# Patient Record
Sex: MALE | Race: BLACK OR AFRICAN AMERICAN
[De-identification: names, ages, dates, MRNs, and addresses within clinical notes are randomized per-mention and may not be internally consistent; named-entity substitution may affect disease eponyms.]

---

## 2017-02-16 ENCOUNTER — HOSPITAL ENCOUNTER (OUTPATIENT)
Dept: HOSPITAL 17 - HSDC | Age: 77
Discharge: HOME | End: 2017-02-16
Attending: SURGERY
Payer: MEDICARE

## 2017-02-16 VITALS — BODY MASS INDEX: 19.38 KG/M2 | HEIGHT: 70 IN | WEIGHT: 135.36 LBS

## 2017-02-16 VITALS
RESPIRATION RATE: 16 BRPM | DIASTOLIC BLOOD PRESSURE: 75 MMHG | HEART RATE: 81 BPM | TEMPERATURE: 97.4 F | OXYGEN SATURATION: 100 % | SYSTOLIC BLOOD PRESSURE: 110 MMHG

## 2017-02-16 DIAGNOSIS — N18.6: Primary | ICD-10-CM

## 2017-02-16 DIAGNOSIS — T82.898A: ICD-10-CM

## 2017-02-16 DIAGNOSIS — I12.0: ICD-10-CM

## 2017-02-16 DIAGNOSIS — E78.00: ICD-10-CM

## 2017-02-16 LAB
ANION GAP SERPL CALC-SCNC: 5 MEQ/L (ref 5–15)
BASOPHILS # BLD AUTO: 0 TH/MM3 (ref 0–0.2)
BASOPHILS NFR BLD: 0.5 % (ref 0–2)
BUN SERPL-MCNC: 23 MG/DL (ref 7–18)
CHLORIDE SERPL-SCNC: 107 MEQ/L (ref 98–107)
EOSINOPHIL # BLD: 0 TH/MM3 (ref 0–0.4)
EOSINOPHIL NFR BLD: 0.8 % (ref 0–4)
ERYTHROCYTE [DISTWIDTH] IN BLOOD BY AUTOMATED COUNT: 17.1 % (ref 11.6–17.2)
GFR SERPLBLD BASED ON 1.73 SQ M-ARVRAT: 18 ML/MIN (ref 89–?)
HCO3 BLD-SCNC: 28.6 MEQ/L (ref 21–32)
HCT VFR BLD CALC: 29.8 % (ref 39–51)
HEMO FLAGS: (no result)
LYMPHOCYTES # BLD AUTO: 0.8 TH/MM3 (ref 1–4.8)
LYMPHOCYTES NFR BLD AUTO: 20.7 % (ref 9–44)
MCH RBC QN AUTO: 36.8 PG (ref 27–34)
MCHC RBC AUTO-ENTMCNC: 33.7 % (ref 32–36)
MCV RBC AUTO: 109.2 FL (ref 80–100)
MONOCYTES NFR BLD: 13.9 % (ref 0–8)
NEUTROPHILS # BLD AUTO: 2.6 TH/MM3 (ref 1.8–7.7)
NEUTROPHILS NFR BLD AUTO: 64.1 % (ref 16–70)
PLATELET # BLD: 131 TH/MM3 (ref 150–450)
POTASSIUM SERPL-SCNC: 4.7 MEQ/L (ref 3.5–5.1)
RBC # BLD AUTO: 2.73 MIL/MM3 (ref 4.5–5.9)
SODIUM SERPL-SCNC: 141 MEQ/L (ref 136–145)
WBC # BLD AUTO: 4 TH/MM3 (ref 4–11)

## 2017-02-16 PROCEDURE — 49422 REMOVE TUNNELED IP CATH: CPT

## 2017-02-16 PROCEDURE — 85025 COMPLETE CBC W/AUTO DIFF WBC: CPT

## 2017-02-16 PROCEDURE — 36818 AV FUSE UPPR ARM CEPHALIC: CPT

## 2017-02-16 PROCEDURE — 80048 BASIC METABOLIC PNL TOTAL CA: CPT

## 2017-02-16 PROCEDURE — 01844 ANES VASC SHUNT/SHUNT REVJ: CPT

## 2017-02-17 NOTE — MP
cc:

BRANDON REYES M.D.

****

 

Corrected Copy:  2/22/17

 

DATE OF SURGERY: 02/16/2017.

 

PREOPERATIVE DIAGNOSIS:

Chronic kidney disease, non-utilized peritoneal dialysis catheter. Needs

permanent hemodialysis access.

 

POSTOPERATIVE DIAGNOSIS:

Chronic kidney disease, non-utilized peritoneal dialysis catheter. Needs

permanent hemodialysis access.

 

OPERATIVE PROCEDURE PERFORMED:

1. Right radiocephalic arteriovenous fistula creation.

2. Removal of non-used peritoneal dialysis catheter.

 

SURGEON:

Brandon Reyes M.D.

 

FIRST ASSISTANT:

TRUNG Green.

 

ANESTHESIA:

Local MAC.

 

DESCRIPTION OF THE PROCEDURE IN DETAIL:

With the patient in the supine position and under IV sedation, the right arm

and abdomen were prepped with Betadine and draped in a sterile fashion.  One

gram of Ancef was administered intravenously, and following a protocol

time-out, the skin and subcutaneous tissue along the distal lateral volar

forearm was infiltrated with 0.5% Marcaine with epinephrine.

 

A vertical 3 cm incision was performed through which the radial artery and

cephalic vein were circumferentially mobilized. The vein was ligated distally

with 4-0 silk, transected proximal to the ligature, spatulated on-end, gently

flushed and gently hydrostatically dilated with heparinized saline and occluded

with a Yasargil clip.  The radial artery was occluded proximally and distally

with Yasargil clips. A vertical approximately 4 mm arteriotomy was performed

along the anterolateral surface. The arterial lumen was flushed proximally and

distally with heparinized saline.

 

The vein was anastomosed to the arteriotomy with continuous 7-0 Prolene.  The

occluding Yasargil clips were then removed establishing pulsatile flow within

the fistula as well as into the distal radial artery.  Strict hemostasis was

assured.  The incision was closed with interrupted subcutaneous 5-0 Monocryl,

continuous subcuticular 5-0 Monocryl, reinforced with Steri-Strips and covered

with sterile gauze.

 

Attention was then directed to removal of the non-used peritoneal dialysis

catheter.  The skin and subcutaneous tissue surrounding the catheter placement

scar was infiltrated with 0.5% Marcaine with epinephrine.  The old scar was

re-incised and dissection continued sharply through the underlying subcutaneous

tissue.  The internal catheter cuff embedded within the rectus musculature was

completely mobilized free and the intraperitoneal portion of the catheter

removed. The subcutaneous catheter cuff was mobilized and subcutaneous portion

of the catheter removed entirely as well.  The circular-shaped the rectus

fascial defect was secured with interrupted 0-PDS and the skin incision secured

with continuous subcuticular 5-0 Monocryl, reinforced with Steri-Strips and

covered with sterile gauze.  Instrument, needle, sponge counts were correct x2.

No operative complications.  The patient returned to the recovery room in

stable condition having tolerated the procedure well.

 

NOTE:

The right radial artery and cephalic vein are both of minimally adequate

diameter for AV fistula creation attempt.  Because of the small vascular

diameters, maturation may be delayed or  could prove inadequate.

 

 

                              _________________________________

                              MD JUAN Abdi/JCROBERT

D:  2/16/2017/9:14 PM

T:  2/22/2017/9:52 AM

Visit #:  [id number]

Job #:  [id number]

## 2017-03-17 ENCOUNTER — HOSPITAL ENCOUNTER (INPATIENT)
Dept: HOSPITAL 17 - NEPC | Age: 77
LOS: 2 days | Discharge: HOME | DRG: 69 | End: 2017-03-19
Attending: FAMILY MEDICINE | Admitting: FAMILY MEDICINE
Payer: MEDICARE

## 2017-03-17 VITALS
RESPIRATION RATE: 17 BRPM | SYSTOLIC BLOOD PRESSURE: 180 MMHG | OXYGEN SATURATION: 97 % | TEMPERATURE: 97.1 F | DIASTOLIC BLOOD PRESSURE: 102 MMHG | HEART RATE: 96 BPM

## 2017-03-17 VITALS — WEIGHT: 136.25 LBS | HEIGHT: 69 IN | BODY MASS INDEX: 20.18 KG/M2

## 2017-03-17 VITALS
RESPIRATION RATE: 16 BRPM | SYSTOLIC BLOOD PRESSURE: 183 MMHG | DIASTOLIC BLOOD PRESSURE: 99 MMHG | HEART RATE: 97 BPM | OXYGEN SATURATION: 95 %

## 2017-03-17 VITALS — HEART RATE: 90 BPM

## 2017-03-17 VITALS
RESPIRATION RATE: 14 BRPM | OXYGEN SATURATION: 97 % | HEART RATE: 94 BPM | SYSTOLIC BLOOD PRESSURE: 184 MMHG | DIASTOLIC BLOOD PRESSURE: 98 MMHG

## 2017-03-17 VITALS
HEART RATE: 100 BPM | RESPIRATION RATE: 20 BRPM | OXYGEN SATURATION: 97 % | SYSTOLIC BLOOD PRESSURE: 187 MMHG | TEMPERATURE: 97.6 F | DIASTOLIC BLOOD PRESSURE: 101 MMHG

## 2017-03-17 VITALS
SYSTOLIC BLOOD PRESSURE: 168 MMHG | OXYGEN SATURATION: 97 % | TEMPERATURE: 97.1 F | DIASTOLIC BLOOD PRESSURE: 104 MMHG | HEART RATE: 88 BPM | RESPIRATION RATE: 18 BRPM

## 2017-03-17 VITALS — OXYGEN SATURATION: 96 %

## 2017-03-17 VITALS
RESPIRATION RATE: 18 BRPM | OXYGEN SATURATION: 96 % | SYSTOLIC BLOOD PRESSURE: 182 MMHG | DIASTOLIC BLOOD PRESSURE: 99 MMHG | HEART RATE: 92 BPM

## 2017-03-17 VITALS — OXYGEN SATURATION: 94 %

## 2017-03-17 VITALS — OXYGEN SATURATION: 97 %

## 2017-03-17 DIAGNOSIS — H53.8: ICD-10-CM

## 2017-03-17 DIAGNOSIS — I35.1: ICD-10-CM

## 2017-03-17 DIAGNOSIS — I45.81: ICD-10-CM

## 2017-03-17 DIAGNOSIS — Z66: ICD-10-CM

## 2017-03-17 DIAGNOSIS — Z99.2: ICD-10-CM

## 2017-03-17 DIAGNOSIS — N18.6: ICD-10-CM

## 2017-03-17 DIAGNOSIS — I12.0: ICD-10-CM

## 2017-03-17 DIAGNOSIS — Z79.82: ICD-10-CM

## 2017-03-17 DIAGNOSIS — E78.00: ICD-10-CM

## 2017-03-17 DIAGNOSIS — G45.9: Primary | ICD-10-CM

## 2017-03-17 DIAGNOSIS — R26.2: ICD-10-CM

## 2017-03-17 DIAGNOSIS — E07.9: ICD-10-CM

## 2017-03-17 DIAGNOSIS — R47.01: ICD-10-CM

## 2017-03-17 DIAGNOSIS — Z21: ICD-10-CM

## 2017-03-17 LAB
ALP SERPL-CCNC: 212 U/L (ref 45–117)
ALT SERPL-CCNC: 31 U/L (ref 12–78)
ANION GAP SERPL CALC-SCNC: 9 MEQ/L (ref 5–15)
APTT BLD: 24.4 SEC (ref 24.3–30.1)
AST SERPL-CCNC: 39 U/L (ref 15–37)
BASOPHILS # BLD AUTO: 0 TH/MM3 (ref 0–0.2)
BASOPHILS NFR BLD: 0.7 % (ref 0–2)
BILIRUB SERPL-MCNC: 0.4 MG/DL (ref 0.2–1)
BUN SERPL-MCNC: 36 MG/DL (ref 7–18)
CHLORIDE SERPL-SCNC: 102 MEQ/L (ref 98–107)
EOSINOPHIL # BLD: 0.1 TH/MM3 (ref 0–0.4)
EOSINOPHIL NFR BLD: 2.1 % (ref 0–4)
ERYTHROCYTE [DISTWIDTH] IN BLOOD BY AUTOMATED COUNT: 16.9 % (ref 11.6–17.2)
GFR SERPLBLD BASED ON 1.73 SQ M-ARVRAT: 10 ML/MIN (ref 89–?)
HCO3 BLD-SCNC: 29.8 MEQ/L (ref 21–32)
HCT VFR BLD CALC: 37 % (ref 39–51)
HEMO FLAGS: (no result)
INR PPP: 1 RATIO
LYMPHOCYTES # BLD AUTO: 0.9 TH/MM3 (ref 1–4.8)
LYMPHOCYTES NFR BLD AUTO: 21.1 % (ref 9–44)
MCH RBC QN AUTO: 34.7 PG (ref 27–34)
MCHC RBC AUTO-ENTMCNC: 31.8 % (ref 32–36)
MCV RBC AUTO: 109 FL (ref 80–100)
MONOCYTES NFR BLD: 10.9 % (ref 0–8)
NEUTROPHILS # BLD AUTO: 2.8 TH/MM3 (ref 1.8–7.7)
NEUTROPHILS NFR BLD AUTO: 65.2 % (ref 16–70)
PLATELET # BLD: 127 TH/MM3 (ref 150–450)
POTASSIUM SERPL-SCNC: 4.5 MEQ/L (ref 3.5–5.1)
PROTHROMBIN TIME: 11.4 SEC (ref 9.8–11.6)
RBC # BLD AUTO: 3.39 MIL/MM3 (ref 4.5–5.9)
SODIUM SERPL-SCNC: 141 MEQ/L (ref 136–145)
WBC # BLD AUTO: 4.3 TH/MM3 (ref 4–11)

## 2017-03-17 PROCEDURE — 80048 BASIC METABOLIC PNL TOTAL CA: CPT

## 2017-03-17 PROCEDURE — 80053 COMPREHEN METABOLIC PANEL: CPT

## 2017-03-17 PROCEDURE — 5A1D00Z: ICD-10-PCS | Performed by: INTERNAL MEDICINE

## 2017-03-17 PROCEDURE — 70551 MRI BRAIN STEM W/O DYE: CPT

## 2017-03-17 PROCEDURE — 93005 ELECTROCARDIOGRAM TRACING: CPT

## 2017-03-17 PROCEDURE — 80061 LIPID PANEL: CPT

## 2017-03-17 PROCEDURE — 90935 HEMODIALYSIS ONE EVALUATION: CPT

## 2017-03-17 PROCEDURE — 96374 THER/PROPH/DIAG INJ IV PUSH: CPT

## 2017-03-17 PROCEDURE — 70544 MR ANGIOGRAPHY HEAD W/O DYE: CPT

## 2017-03-17 PROCEDURE — 96375 TX/PRO/DX INJ NEW DRUG ADDON: CPT

## 2017-03-17 PROCEDURE — 82948 REAGENT STRIP/BLOOD GLUCOSE: CPT

## 2017-03-17 PROCEDURE — 85730 THROMBOPLASTIN TIME PARTIAL: CPT

## 2017-03-17 PROCEDURE — 84100 ASSAY OF PHOSPHORUS: CPT

## 2017-03-17 PROCEDURE — 93880 EXTRACRANIAL BILAT STUDY: CPT

## 2017-03-17 PROCEDURE — 84484 ASSAY OF TROPONIN QUANT: CPT

## 2017-03-17 PROCEDURE — 83036 HEMOGLOBIN GLYCOSYLATED A1C: CPT

## 2017-03-17 PROCEDURE — 83735 ASSAY OF MAGNESIUM: CPT

## 2017-03-17 PROCEDURE — 85025 COMPLETE CBC W/AUTO DIFF WBC: CPT

## 2017-03-17 PROCEDURE — 85610 PROTHROMBIN TIME: CPT

## 2017-03-17 PROCEDURE — 93306 TTE W/DOPPLER COMPLETE: CPT

## 2017-03-17 PROCEDURE — 70450 CT HEAD/BRAIN W/O DYE: CPT

## 2017-03-17 RX ADMIN — SODIUM CHLORIDE, PRESERVATIVE FREE SCH ML: 5 INJECTION INTRAVENOUS at 23:06

## 2017-03-17 RX ADMIN — INSULIN ASPART SCH: 100 INJECTION, SOLUTION INTRAVENOUS; SUBCUTANEOUS at 21:00

## 2017-03-17 RX ADMIN — CALCIUM CARBONATE-CHOLECALCIFEROL TAB 250 MG-125 UNIT SCH MG: 250-125 TAB at 23:06

## 2017-03-17 RX ADMIN — PRAVASTATIN SODIUM SCH MG: 40 TABLET ORAL at 23:06

## 2017-03-17 RX ADMIN — HEPARIN SODIUM SCH UNITS: 10000 INJECTION, SOLUTION INTRAVENOUS; SUBCUTANEOUS at 13:55

## 2017-03-17 RX ADMIN — INSULIN ASPART SCH: 100 INJECTION, SOLUTION INTRAVENOUS; SUBCUTANEOUS at 16:00

## 2017-03-17 NOTE — PD
HPI


Chief Complaint:  Dizziness


Time Seen by Provider:  10:17


Travel History


International Travel<30 days:  No


Contact w/Intl Traveler<30days:  No


Traveled to known affect area:  No





History of Present Illness


HPI


This is a 77-year-old male who has a history of end-stage renal disease 

presents to the emergency department having woken up with dizziness this 

morning.  He says he feels like he can't catch his balance and he feels weak.  

He was having difficulty writing and difficulty getting his words out.  His 

symptoms have been constant and severe throughout the morning and is having 

difficulty walking on his own.  He's never had symptoms like this before.  He 

was supposed to go to dialysis this morning but missed it because he came here.

  His doctor is Dr. Maldonado.





[facility]


Past Medical History


Hx Anticoagulant Therapy:  Yes (BABY ASA DAILY)


Anemia:  Yes


Autoimmune Disease:  Yes (HIV +)


Anxiety:  No


Depression:  No


Heart Rhythm Problems:  Yes (tachycardia)


Cancer:  No


Cardiovascular Problems:  Yes (HTN, CHOL)


High Cholesterol:  Yes


Congestive Heart Failure:  No


COPD:  No


Cerebrovascular Accident:  No


Diabetes:  No


Dialysis:  Yes (was on peritoreal, started hemodialysis 2 months ago)


Diminished Hearing:  No


Deep Vein Thrombosis:  Yes


Endocrine:  Yes


Gastrointestinal Disorders:  Yes (NAUSEA/VOMITING)


Genitourinary:  Yes (renal failure on dialysis )


Hepatitis:  No


Hiatal Hernia:  No


Hypertension:  Yes (NO LONGER, CURRENTLY HYPOTENSION)


Immune Disorder:  Yes (HIV)


Inguinal Hernia:  Yes


Implanted Vascular Access Dvce:  Yes


Musculoskeletal:  No


Neurologic:  Yes (HX OF VERTIGO, NEUROPATHY KAY FEET)


Psychiatric:  No


Reproductive:  No


Respiratory:  No


Immunizations Current:  No


Renal Failure:  Yes


Thyroid Disease:  Yes


Tetanus Vaccination:  < 5 Years


Influenza Vaccination:  Yes


Pregnant?:  Not Pregnant





Past Surgical History


Abdominal Surgery:  Yes (hernia repair x2 groin)


AICD:  No


Body Medical Devices:  TENKHOFF PERITONEAL DIALYSIS CATHETER removed


Joint Replacement:  No


Neurologic Surgery:  Yes (CYST REMOVED FROM SPINAL CORD )


Pacemaker:  No


Other Surgery:  Yes (vascath r chest)





Social History


Alcohol Use:  No


Tobacco Use:  No


Substance Use:  No





Allergies-Medications


(Allergen,Severity, Reaction):  


Coded Allergies:  


     No Known Allergies (Verified , 2/16/17)


Reported Meds & Prescriptions





Reported Meds & Active Scripts


Active


Reported


Cardura (Doxazosin Mesylate) 2 Mg Tab 2 Mg PO HS


Zenpep (Pancrelipase) 25,000-85,000-136,000 Units Cap 2 Cap PO BID


Renvela (Sevelamer Carbonate) 800 Mg Tab 800 Mg PO BID


Megestrol Acetate 1 Pow Pow 800 Mg PO DAILY


Metoprolol Succinate ER 24 HR (Metoprolol Succinate) 25 Mg Tab 12.5 Mg PO BID


Midodrine 5 Mg Tab 5 Mg PO BID


Propafenone (Propafenone HCl) 150 Mg Tab 150 Mg PO BID


Crestor (Rosuvastatin Calcium) 40 Mg Tab 40 Mg PO DAILY


Diltiazem (Diltiazem HCl) 60 Mg Tab 60 Mg PO BID








Review of Systems


Except as stated in HPI:  all other systems reviewed are Neg





Physical Exam


Narrative


GENERAL: Frail elderly male in no acute distress.


SKIN: Warm and dry.


HEAD: Atraumatic. Normocephalic. 


EYES: Pupils equal and round.  No injection or drainage. 


ENT:  Moist mucous membranes


NECK: Trachea midline. 


CARDIOVASCULAR: Regular rate and rhythm.  No murmur appreciated.


RESPIRATORY: Clear to auscultation. Breath sounds equal bilaterally. 


GASTROINTESTINAL: Abdomen soft, non-tender, nondistended. 


MUSCULOSKELETAL: No obvious deformities. 


NEUROLOGICAL: Awake and alert. No obvious cranial nerve deficits.  4 out of 5 

strength in the left upper extremity and 4 out of 5 strength in the right lower 

extremity.  Ataxia of the left upper extremity.  Some expressive aphasia.


PSYCHIATRIC: Appropriate mood and affect; insight and judgment normal.





Data


Data


Last Documented VS





Vital Signs








  Date Time  Temp Pulse Resp B/P Pulse Ox O2 Delivery O2 Flow Rate FiO2


 


3/17/17 10:33     94 Room Air  


 


3/17/17 10:28  98 19    2 


 


3/17/17 10:18    183/99    


 


3/17/17 10:12 97.6       








Orders





 Prothrombin Time / Inr (Pt) (3/17/17 10:31)


Act Partial Throm Time (Ptt) (3/17/17 10:31)


Complete Blood Count With Diff (3/17/17 10:31)


Comprehensive Metabolic Panel (3/17/17 10:31)


Troponin I (3/17/17 10:31)


Ct Brain W/O Iv Contrast(Rout) (3/17/17 10:31)


Ecg Monitoring (3/17/17 10:31)


Iv Access Insert/Monitor (3/17/17 10:31)


Oximetry (3/17/17 10:31)


Sodium Chloride 0.9% Flush (Ns Flush) (3/17/17 10:45)


Electrocardiogram (3/17/17 )


Admit Order (Ed Use Only) (3/17/17 11:31)





Labs





 Laboratory Tests








Test 3/17/17





 10:40


 


White Blood Count 4.3 TH/MM3


 


Red Blood Count 3.39 MIL/MM3


 


Hemoglobin 11.8 GM/DL


 


Hematocrit 37.0 %


 


Mean Corpuscular Volume 109.0 FL


 


Mean Corpuscular Hemoglobin 34.7 PG


 


Mean Corpuscular Hemoglobin 31.8 %





Concent 


 


Red Cell Distribution Width 16.9 %


 


Platelet Count 127 TH/MM3


 


Mean Platelet Volume 9.9 FL


 


Neutrophils (%) (Auto) 65.2 %


 


Lymphocytes (%) (Auto) 21.1 %


 


Monocytes (%) (Auto) 10.9 %


 


Eosinophils (%) (Auto) 2.1 %


 


Basophils (%) (Auto) 0.7 %


 


Neutrophils # (Auto) 2.8 TH/MM3


 


Lymphocytes # (Auto) 0.9 TH/MM3


 


Monocytes # (Auto) 0.5 TH/MM3


 


Eosinophils # (Auto) 0.1 TH/MM3


 


Basophils # (Auto) 0.0 TH/MM3


 


CBC Comment DIFF FINAL 


 


Differential Comment  


 


Prothrombin Time 11.4 SEC


 


Prothromb Time International 1.0 RATIO





Ratio 


 


Activated Partial 24.4 SEC





Thromboplast Time 


 


Sodium Level 141 MEQ/L


 


Potassium Level 4.5 MEQ/L


 


Chloride Level 102 MEQ/L


 


Carbon Dioxide Level 29.8 MEQ/L


 


Anion Gap 9 MEQ/L


 


Blood Urea Nitrogen 36 MG/DL


 


Creatinine 6.35 MG/DL


 


Estimat Glomerular Filtration 10 ML/MIN





Rate 


 


Random Glucose 104 MG/DL


 


Calcium Level 8.7 MG/DL


 


Total Bilirubin 0.4 MG/DL


 


Aspartate Amino Transf 39 U/L





(AST/SGOT) 


 


Alanine Aminotransferase 31 U/L





(ALT/SGPT) 


 


Alkaline Phosphatase 212 U/L


 


Troponin I 0.06 NG/ML


 


Total Protein 8.3 GM/DL


 


Albumin 3.5 GM/DL











[facility]


Medical Decision Making


Medical Screen Exam Complete:  Yes


Emergency Medical Condition:  Yes


Interpretation(s)


Afebrile, mild tachycardia, hypertensive


Macrocytic anemia


Creatinine is 6.3


Potassium is normal


Troponin is slightly elevated at 0.06 likely secondary to end-stage renal 

disease


CT head: No intracranial hemorrhage


Differential Diagnosis


Ischemic stroke, hemorrhagic stroke, seizure, mass


Narrative Course


This is a 77-year-old male who presents to the emergency department with acute 

neurologic symptoms that started when he woke up from sleep.  He went to bed at 

10 PM last night.  He is not in the TPA window.  He was placed on a monitor and 

an IV was established.  He was hypertensive which I permitted given his likely 

ischemic stroke.  Labs were obtained which were reassuring.  CT was negative 

for intracranial hemorrhage.  Patient will be admitted for MRI and neurology 

consultation.





Diagnosis





 Primary Impression:  


 Stroke


 Qualified Code:  I63.9 - Cerebrovascular accident (CVA), unspecified mechanism





Admitting Information


Admitting Physician Requests:  Admit








Nita Gan MD Mar 17, 2017 11:53

## 2017-03-17 NOTE — RADRPT
EXAM DATE/TIME:  03/17/2017 13:12 

 

[facility] COMPARISON:     

MRI BRAIN W/O CONTRAST, March 17, 2017, 13:12.

       

 

 

INDICATIONS :     

Slurred speech. Confusion.

                     

 

MEDICAL HISTORY :     

Hypertension. HIV.   

 

SURGICAL HISTORY :           

Hernia.  dialysis

 

ENCOUNTER:     

Initial

 

ACUITY:     

1 day

 

PAIN SCORE:     

0/10

 

LOCATION:       

cranial 

 

Please note a normal MRA of the brain does not entirely exclude the possibility of a small aneurysm, 


nor the possibility of distal intracranial vessel disease.

 

TECHNIQUE:     

3D time of flight MRA was performed.  Source images, multiplanar STS MIP, and 3D volume MIP reconstru
ctions were reviewed.

 

FINDINGS:     

There is excellent visualization of the major intracranial arteries out to the second-order branch ve
ssels.  There is no evidence for aneurysm, vessel truncation or stenosis, and no evidence for vascula
r malformation.

 

CONCLUSION:     

No acute disease.  

 

 

 

 Toni Vaca Jr., MD on March 17, 2017 at 13:58           

Board Certified Radiologist.

 This report was verified electronically.

## 2017-03-17 NOTE — HHI.HP
[facility]


Service


Family Medicine


Primary Care Physician


Cha Parada MD


Admission Diagnosis


stroke


Diagnoses:  


(1) Stroke


(2) ESRD (end stage renal disease) on dialysis


(3) HIV (human immunodeficiency virus infection)


(4) Aortic regurgitation


(5) Fluids/Electrolytes/Nutrition/Prophylaxis 


(6) QT prolongation


International Travel<30 Days:  No


Contact w/Intl Traveler<30days:  No


Known Affected Area:  No


History of Present Illness


78 yo AAM presenting to the ED with confusion, slurred speech, and difficulty 

with finding words starting this morning.  He states he was normal at 

dinnertime last night and when he went to bed around 10 PM.  However, when he 

woke up this morning, he was noted to have slurred speech and difficulty 

finding words when he was talking to his friend.  He states that he could not 

read the newspaper this morning due to blurry vision. He denies headaches, 

dizziness, and chest pain. Chest did "feel off" this morning, not like pressure 

or pain but "I knew something was wrong." Denies jaw or arm pain. Denies 

numbness or tingling in the body, including face and tongue.  Better now.





At the time of this interview, he was doing much better with resolution of his 

slurred speech and his ability to find words.


He endorses some left arm weakness, with some inability to raise his arm 

actively over his head.  This continues at this time.





Past Family Social History


Past Medical History


ESRD - [facility], Dr. Maldonado


HIVDr. Brian See- ID, HIV (does not know previous CD4 count, states viral 

load)


Cardiology - Dr. Bagley (last echo documented in EMR is 2013, EF 55-60%, mild AR

)


PCP - Dr. Coyle





Denies a history of DM, CVA, MI. Denies previous clots or stents.


Allergies:  


Coded Allergies:  


     No Known Allergies (Verified , 2/16/17)


Family History


Family hstory of HTN


Denies family history of clotting disorders


Social History


Smoke: nonsmoker


Alcohol: denies


Illicit: denies





Lives: alone, no assistive devices, normally active


NOK: brother in [place], Alexander Callahan


Has DNR in bedroom in closet





Physical Exam


Vital Signs





Vital Signs








  Date Time  Temp Pulse Resp B/P Pulse Ox O2 Delivery O2 Flow Rate FiO2


 


3/17/17 20:27 97.1 96 17 180/102 97   


 


3/17/17 18:22     97 Nasal Cannula 2.00 


 


3/17/17 16:00 97.1 88 18 168/104 97   


 


3/17/17 13:54  92 18 182/99 96 Nasal Cannula 2 


 


3/17/17 13:53     96 Nasal Cannula 2.00 


 


3/17/17 12:35  94 14 184/98 97 Nasal Cannula 2 


 


3/17/17 10:33     94 Room Air  


 


3/17/17 10:28  98 19  98 Nasal Cannula 2 


 


3/17/17 10:18  97 16 183/99 95   


 


3/17/17 10:12 97.6 100 20 187/101 97 Room Air  








Physical Exam


GENERAL: This is a pleasant, thin,  male lying in bed in no 

obvious distress


SKIN: No rashes, ecchymoses or lesions. Cool and dry. There is a recently 

placed fistula palpable on the right arm. There is a Vas-Cath placed in the 

right upper chest.


HEAD: Atraumatic. Normocephalic. Healed scar on right forehead.


EYES: Arcus senilis noted bilaterally. Pupils equal round and reactive, 

approximately 2 mm. Extraocular motions intact. No scleral icterus. No 

injection or drainage. 


NECK: Trachea midline. No JVD or lymphadenopathy. Supple, nontender, no 

meningeal signs.


CARDIOVASCULAR: Regular rate and rhythm. There is a 3/6 systolic ejection 

murmur heard throughout the cardiac field, loudest at the left upper sternal 

border.


RESPIRATORY: Clear to auscultation. Breath sounds equal bilaterally. No wheezes

, rales, or rhonchi.


GASTROINTESTINAL: Abdomen soft, thin, non-tender, nondistended. No hepato-

splenomegaly, or palpable masses. No guarding.


MUSCULOSKELETAL: Extremities without clubbing, cyanosis, or edema. No joint 

tenderness, effusion, or edema noted. No calf tenderness. Negative Homans sign 

bilaterally.


NEUROLOGICAL: Awake and alert. Cranial nerves II through XII intact. . Four out 

of 5 muscle strength in all muscle groups. Normal speech at time of exam.


Laboratory





Laboratory Tests








Test 3/17/17 3/17/17





 10:40 18:00


 


White Blood Count 4.3  


 


Red Blood Count 3.39  


 


Hemoglobin 11.8  


 


Hematocrit 37.0  


 


Mean Corpuscular Volume 109.0  


 


Mean Corpuscular Hemoglobin 34.7  


 


Mean Corpuscular Hemoglobin 31.8  





Concent  


 


Red Cell Distribution Width 16.9  


 


Platelet Count 127  


 


Mean Platelet Volume 9.9  


 


Neutrophils (%) (Auto) 65.2  


 


Lymphocytes (%) (Auto) 21.1  


 


Monocytes (%) (Auto) 10.9  


 


Eosinophils (%) (Auto) 2.1  


 


Basophils (%) (Auto) 0.7  


 


Neutrophils # (Auto) 2.8  


 


Lymphocytes # (Auto) 0.9  


 


Monocytes # (Auto) 0.5  


 


Eosinophils # (Auto) 0.1  


 


Basophils # (Auto) 0.0  


 


CBC Comment DIFF FINAL  


 


Differential Comment   


 


Prothrombin Time 11.4  


 


Prothromb Time International 1.0  





Ratio  


 


Activated Partial 24.4  





Thromboplast Time  


 


Sodium Level 141  


 


Potassium Level 4.5  


 


Chloride Level 102  


 


Carbon Dioxide Level 29.8  


 


Anion Gap 9  


 


Blood Urea Nitrogen 36  


 


Creatinine 6.35  


 


Estimat Glomerular Filtration 10  





Rate  


 


Random Glucose 104  


 


Calcium Level 8.7  


 


Total Bilirubin 0.4  


 


Aspartate Amino Transf 39  





(AST/SGOT)  


 


Alanine Aminotransferase 31  





(ALT/SGPT)  


 


Alkaline Phosphatase 212  


 


Troponin I 0.06  0.07 


 


Total Protein 8.3  


 


Albumin 3.5  








Result Diagram:  


3/17/17 1040                                                                   

             3/17/17 1040





Imaging





Last Impressions








Head CT 3/17/17 1031 Signed





Impressions: 





 Service Date/Time:  Friday, March 17, 2017 10:56 - CONCLUSION:  1. No acute 





 intracranial abnormality seen. 2. Age-related atrophy and suspected small 

vessel 





 ischemic change in the white matter.     Jon Jordan MD 











Assessment and Plan


Assessment and Plan


77 year old male with a history of HIV and ESRD who presents with acute 

neurologic changes, last normal this morning, admitted for stroke work-up.


Problem List:  


(1) Neurologic abnormality


Status:  Acute


Plan:  Acute CVA vs. TIA.





-CT was negative for bleeding but with diffuse white matter changes


-MRI/MRA brain to r/o ischemic stroke.


-Carotid US pending.


-EKG does not show evidence of atrial fibrillation or infarct, notable for 

sinus rhythm and possible left atrial enlargement. Unchanged from EKG 

documented November 2016.


- MI r/o ordered with understanding the stroke itself can cause elevated 

troponins. 


-2D ECHO orderd





Aspirin 325mg daily


-Start atorvastatin daily.  


-Neuro checks q4 hrs.  Tele. 


-HOB flat for 12 hrs in accordance with new [facility] protocols. 


-Bedrest with fall precautions. 


-Consult neurology, rehab medicine, PT/OT/ST and case management. 


-If pt passes bedside swallow, will allow for heart healthy diet. 





(2) ESRD (end stage renal disease) on dialysis


Status:  Chronic


Plan:  Chronic. On dialysis MWF by Vas-Cath. Last dialysis session was 

Wednesday. Will need dialysis today. Nephrology consult placed.


-Continue dialysis MWF as inpatient


-Continue home meds


-Other recs per nephrology





(3) HIV (human immunodeficiency virus infection)


Status:  Chronic


Plan:  Unknown last CD4, reportedly low viral load. ID physician is Dr. See.


-Patient on ARV's, he is unsure which ones, med reconciliation not completed at 

admission


-We will determine which medications he is on and restart if indicated





(4) Aortic regurgitation


Status:  Acute


Plan:  Significant murmur noted on exam today. Last echo in EMR 2013 showing EF 

55% and mild AR. 


-Will repeat along with stroke work-up as above





(5) Fluids/Electrolytes/Nutrition/Prophylaxis 


Status:  Acute


Plan:  


Fluids: ESRD on dialysis MWF


Electrolytes: In normal limits at admission, will monitor


Nutrition: Renal diet, nothing by mouth for now given stroke workup


DVT Prophylaxis: Heparin 5000U subQ q12hr


GI Prophylaxis: Not indicated





(6) QT prolongation


Status:  Acute


Plan:  QTc 437 on EKG. 


-Avoid QT prolonging medications


-telemetry








Physician Certification


2 Midnight Certification Type:  Admission for Inpatient Services


Order for Inpatient Services


The services are ordered in accordance with Medicare regulations or non-

Medicare payer requirements, as applicable.  In the case of services not 

specified as inpatient-only, they are appropriately provided as inpatient 

services in accordance with the 2-midnight benchmark.


Estimated LOS (days):  2


2 days is the estimated time the patient will need to remain in the hospital, 

assuming treatment plan goals are met and no additional complications.


Post-Hospital Plan:  Not yet determined





Problem Qualifiers





(1) Stroke:  


Qualified Code:  I63.9 - Cerebrovascular accident (CVA), unspecified mechanism





Eron Lynn MD Mar 17, 2017 22:14

## 2017-03-17 NOTE — MB
cc:

BEATRICE MUNOZ MD

****

 

 

DATE OF CONSULTATION

3/17/17

 

REASON FOR CONSULTATION

Possible stroke

 

HISTORY OF PRESENT ILLNESS

Mr. Patel is a 77-year-old -American male with past medical history of

HIV, end-stage renal disease who presented because of speech difficulty and

confusion.  His friend who is at the bedside states that they were both

together last night and he was fine and normal and he drove back to his home.

This morning, the patient states that he was feeling, "funny and not myself"

and when he was reading the newspaper he felt his eyes were blurry and when he

wanted to write something, "it did not make sense" and he had some difficulty

finding the words. The friend who was at bedside during the encounter confirmed

this incident, but he was back to normal within a few minutes. He denies

headache, twitches, convulsions, weakness of an extremity.  He denies similar

condition in the past. He has been following with his cardiologist. He is not

certain whether he has had any history of atrial fibrillation, however, review

of the medical records there is a mention of sinus tachycardia.  A head CT scan

was negative for any acute event.  The patient was not a candidate for IV TPA

or to be called a stroke alert because of the last time he was seen normal was

out of the therapeutic window.

 

REVIEW OF SYSTEMS

A 12-point review of systems is negative except for what is stated in the HPI.

 

PAST MEDICAL HISTORY

1.  End-stage renal disease,

2.  HIV,

3.  Follows up with cardiology, not certain about a diagnosis

 

MEDICATIONS

1.  Cardura

2.  Renvela

3.  Megestrol

4.  Metoprolol

5.  Midodrine

6.  Crestor

7.  Diltiazem

 

ALLERGIES

No known allergies.

 

FAMILY HISTORY

Noncontributory

 

SOCIAL HISTORY

Denies smoking, alcohol or illicit drug abuse.  He lives alone.  Does not use

any assistive device and at baseline is active.

 

PHYSICAL EXAMINATION

GENERAL:  Pleasant, aware, alert, not in acute distress.  Good historian with

his friend at bedside.

HEENT:  Atraumatic, normocephalic.  Intact hearing and intact vision.  There is

a fistula on the right arm.

NECK:  Trachea in the midline.  No carotid bruit.  

CARDIOVASCULAR:  Regular rate and rhythm.

RESPIRATORY: Clear to auscultation.  No wheezing  

MUSCULOSKELETAL: No clubbing,cyanosis or edema. Moves all extremities equally.

NEUROLOGIC:  Awake, alert, oriented to time, person and place.  Intact naming.

Intact repetition.  Cranial nerves II-XII are grossly intact.  Motor 
examination 5/5 bilateral symmetrical with occasional give-away due to not 
understanding the commands.  Reflexes 2+ bilateral and symmetrical.  Plantars 
are bilateral going. Sensation is intact bilateral and symmetrical. Finger-to-
nose is abnormal.  The patient did have difficulty in the right upper extremity 
with overshoot and mild tremor. It was inconsistent but notable and at certain 
times the patient felt weak on the left upper extremity, but that was 
transient. 

PSYCHIATRIC: Appropriate mood & affect, insight and judgment. No hallucination.

 

IMAGING STUDIES

- Head CT scan without contrast with no acute intracranial abnormality,

age-related atrophy and suspected small vessel ischemic changes in the white

matter. I reviewed the head CT scan, there was extensive white matter disease.

- Carotid ultrasound is with mild plaque involving the ICA  without

hemodynamically significant stenosis.

 

IMPRESSION

1.  TIA possible left MCA territory

2.  Possible ischemic stroke

 

PLAN

1.  Neuro checks q. four hourly.

2.  Aspirin 81 mg

3.  Telemetry

4.  Cardiac echo

5.  MRI brain

6.  MRA head

7.  DVT prophylaxis.

8.  PT OT recommendations are appreciated.

 

Thank you for the opportunity to participate in the care of your patient.

 

 

 

                              _________________________________

                              MD EMMY Wells/

D:  3/17/2017/4:09 PM

T:  3/17/2017/5:02 PM

Visit #:  [id number]

Job #:  [id number]

AMY

## 2017-03-17 NOTE — RADRPT
EXAM DATE/TIME:  03/17/2017 10:56 

 

[facility] COMPARISON:     

CT BRAIN W/O CONTRAST, November 14, 2016, 19:00.

 

 

INDICATIONS :     

Dizziness.

                      

 

RADIATION DOSE:     

35.70 CTDIvol (mGy) 

 

 

 

MEDICAL HISTORY :     

Hypertension. Deep venous thrombosis. HIV.Dialysis.

 

SURGICAL HISTORY :      

None. 

 

ENCOUNTER:      

Initial

 

ACUITY:      

1 day

 

PAIN SCALE:      

0/10

 

LOCATION:        

cranial 

 

TECHNIQUE:     

Multiple contiguous axial images were obtained of the head.  Using automated exposure control and adj
ustment of the mA and/or kV according to patient size, radiation dose was kept as low as reasonably a
chievable to obtain optimal diagnostic quality images. 

 

FINDINGS:     

 

CEREBRUM:     

The ventricles and cortical sulci are widened. There is decreased density in the cerebral white matte
r.  No evidence of midline shift, mass lesion, hemorrhage or acute infarction.  No extra-axial fluid 
collections are seen.

 

POSTERIOR FOSSA:     

The cerebellum and brainstem are intact.  The 4th ventricle is midline.  The cerebellopontine angle i
s unremarkable.

 

EXTRACRANIAL:     

The visualized portion of the orbits is intact.

 

SKULL:     

The calvaria is intact.  No evidence of skull fracture.

 

CONCLUSION:     

1. No acute intracranial abnormality seen.

2. Age-related atrophy and suspected small vessel ischemic change in the white matter.

 

 

 

 Jon Jordan MD on March 17, 2017 at 11:18           

Board Certified Radiologist.

 This report was verified electronically.

## 2017-03-17 NOTE — PD.CONS
HPI


Service


Nephrology


Consult Requested By





Reason for Consult


ESRD on HD


Primary Care Physician


Cha Parada MD


History of Present Illness


This is out 76 y/o dialysis pt who gets treatment M-w-F. I saw him at the HD 

clinic on Wednesday, he was doing well. Apparently today he developed dysarthria

, difficulty writing, and had blurry vision. He was brought in for evaluation. 

PMH of HTN, HIV, he was on PD but did poorly and converted to hemodialysis. His 

brother is at the bedside, they both report symptom improvement. He did not 

have dialysis today. His anemia is stable. He is not in pain, we were consulted 

for management.  (Ana Paula Figueroa)





Review of Systems


Eyes:  COMPLAINS OF: Blurred vision,  DENIES: Eye inflammation, Eye pain


Cardiovascular:  DENIES: Chest pain, Dyspnea on Exertion, Lower Extremity Edema


Gastrointestinal:  DENIES: Abdominal pain


Neurologic:  COMPLAINS OF: Headache, Localized weakness, Paresthesias, Poor 

Balance,  DENIES: Abnormal gait (Ana Paula Figueroa)





Past Family Social History


Allergies:  


Coded Allergies:  


     No Known Allergies (Verified , 2/16/17)


Past Medical History


ESRD on HD M-W-F, formerly on PD


HIV on HAART


anemia


metabolic bone disorder


HTN


Past Surgical History


PD catheter, removed


Permcath right chest


Reported Medications


Cardura (Doxazosin Mesylate) 2 Mg Tab 2 Mg PO HS


Zenpep (Pancrelipase) 25,000-85,000-136,000 Units Cap 2 Cap PO BID


Renvela (Sevelamer Carbonate) 800 Mg Tab 800 Mg PO BID


Megestrol Acetate 1 Pow Pow 800 Mg PO DAILY


Metoprolol Succinate ER 24 HR (Metoprolol Succinate) 25 Mg Tab 12.5 Mg PO BID


Midodrine 5 Mg Tab 5 Mg PO BID


Propafenone (Propafenone HCl) 150 Mg Tab 150 Mg PO BID


Crestor (Rosuvastatin Calcium) 40 Mg Tab 40 Mg PO DAILY


Diltiazem (Diltiazem HCl) 60 Mg Tab 60 Mg PO BID


Active Ordered Medications





 Current Medications








 Medications


  (Trade)  Dose


 Ordered  Sig/Vaishali


 Route  Start Time


 Stop Time Status Last Admin


 


  (NS Flush)  2 ml  BID


 IVF  3/17/17 21:00


     


 


 


  (NS Flush)  2 ml  UNSCH  PRN


 IVF  3/17/17 12:45


     


 


 


  (Vasotec  Inj)  1.25 mg  Q4H  PRN


 IV  3/17/17 12:45


     


 


 


  (Aspirin)  325 mg  DAILY


 PO  3/17/17 13:00


    3/17/17 13:55


 


 


  (Pravachol)  40 mg  HS


 PO  3/17/17 21:00


     


 


 


  (D50w (Vial) Inj)  25 ml  UNSCH  PRN


 IV PUSH  3/17/17 12:45


     


 


 


  (Glucagon Inj)  1 mg  UNSCH  PRN


 IM/SQ  3/17/17 12:45


     


 


 


  (Folate)  1 mg  DAILY


 PO  3/18/17 09:00


     


 


 


  (Synthroid)  200 mcg  DAILY@0600


 PO  3/18/17 06:00


     


 


 


  (KCl)  10 meq  DAILY


 PO  3/18/17 09:00


     


 


 


  (Renvela)  800 mg  DAILY


 PO  3/18/17 09:00


     


 


 


  (Oscal-D 250-125)  500 mg  BID


 PO  3/17/17 21:00


     


 


 


  (Heparin Inj)  5,000 units  Q12H


 SQ  3/17/17 13:30


    3/17/17 13:55


 


 


 Acetaminophen 650


 mg  650 mg  Q4H  PRN


 PO  3/17/17 13:45


     


 


 


  (NS 1000 ml Inj)  1,000 ml @ 


 0 mls/hr  Q0M PRN


 IV  3/17/17 15:02


     


 


 


 Heparin Sodium


  (Porcine) 8000


 units  8,000 units  UNSCH  PRN


 IVF  3/17/17 15:15


     


 


 


 Sodium Chloride  1,000 ml @ 


 200 mls/hr  Q5H PRN


 IV  3/17/17 15:02


     


 


 


  (NS 1000 ml Inj)  1,000 ml @ 


 0 mls/hr  Q0M PRN


 IV  3/17/17 15:02


     


 


 


  (Mannitol Inj)  12.5 gm  UNSCH  PRN


 IV  3/17/17 15:15


     


 


 


  (Albumin 25% Inj)  25 gm  UNSCH  PRN


 IV  3/17/17 15:15


     


 


 


  (NS Flush)  5 ml  UNSCH  PRN


 IVF  3/17/17 15:15


     


 


 


  (Heparin Inj)    UNSCH  PRN


 .XX  3/17/17 15:15


     


 


 


  (Gentamicin


  (Dialysis) Inj)  20 mg  UNSCH  PRN


 IV  3/17/17 15:15


     


 


 


  (Zofran Inj)  4 mg  UNSCH  PRN


 IV  3/17/17 15:15


     


 


 


  (Tylenol)  650 mg  UNSCH  PRN


 PO  3/17/17 15:15


     


 


 


  (Benadryl)  25 mg  UNSCH  PRN


 PO  3/17/17 15:15


     


 


 


  (Nitrostat Sl)  0.4 mg  UNSCH  PRN


 SL  3/17/17 15:15


     


 


 


  (Catapres)  0.1 mg  UNSCH  PRN


 PO  3/17/17 15:15


     


 


 


  (Epogen Inj)  5,000 units  UNSCH  PRN


 IV  3/17/17 15:15


     


 


 


  (Gelfoam 12 Mm/7


 Mm Top)  1 foam  UNSCH  PRN


 TOP  3/17/17 15:15


     


 








Family History


No hx of renal disorders


Social History


brother lives nearby


he lives alone


no smoking or ETOH


retired


he states he is a DNR (Ana Paula Figueroa)





Physical Exam


Vital Signs





 Vital Signs








  Date Time  Temp Pulse Resp B/P Pulse Ox O2 Delivery O2 Flow Rate FiO2


 


3/17/17 16:00 97.1 88 18 168/104 97   


 


3/17/17 13:54  92 18 182/99 96 Nasal Cannula 2 


 


3/17/17 13:53     96 Nasal Cannula 2.00 


 


3/17/17 12:35  94 14 184/98 97 Nasal Cannula 2 


 


3/17/17 10:33     94 Room Air  


 


3/17/17 10:28  98 19  98 Nasal Cannula 2 


 


3/17/17 10:18  97 16 183/99 95   


 


3/17/17 10:12 97.6 100 20 187/101 97 Room Air  








Physical Exam


Elderly frail AAM lying supine in bed


awake, alert, slow to respond


moves all extremities


CV: S1/S2, normal rate, bP borderline high


resp: clear in all fields, Permcath right chest


ext: trace pedal edema , distal pulses intact


Laboratory





Laboratory Tests








Test 3/17/17





 10:40


 


White Blood Count 4.3 


 


Red Blood Count 3.39 


 


Hemoglobin 11.8 


 


Hematocrit 37.0 


 


Mean Corpuscular Volume 109.0 


 


Mean Corpuscular Hemoglobin 34.7 


 


Mean Corpuscular Hemoglobin 31.8 





Concent 


 


Red Cell Distribution Width 16.9 


 


Platelet Count 127 


 


Mean Platelet Volume 9.9 


 


Neutrophils (%) (Auto) 65.2 


 


Lymphocytes (%) (Auto) 21.1 


 


Monocytes (%) (Auto) 10.9 


 


Eosinophils (%) (Auto) 2.1 


 


Basophils (%) (Auto) 0.7 


 


Neutrophils # (Auto) 2.8 


 


Lymphocytes # (Auto) 0.9 


 


Monocytes # (Auto) 0.5 


 


Eosinophils # (Auto) 0.1 


 


Basophils # (Auto) 0.0 


 


CBC Comment DIFF FINAL 


 


Differential Comment  


 


Prothrombin Time 11.4 


 


Prothromb Time International 1.0 





Ratio 


 


Activated Partial 24.4 





Thromboplast Time 


 


Sodium Level 141 


 


Potassium Level 4.5 


 


Chloride Level 102 


 


Carbon Dioxide Level 29.8 


 


Anion Gap 9 


 


Blood Urea Nitrogen 36 


 


Creatinine 6.35 


 


Estimat Glomerular Filtration 10 





Rate 


 


Random Glucose 104 


 


Calcium Level 8.7 


 


Total Bilirubin 0.4 


 


Aspartate Amino Transf 39 





(AST/SGOT) 


 


Alanine Aminotransferase 31 





(ALT/SGPT) 


 


Alkaline Phosphatase 212 


 


Troponin I 0.06 


 


Total Protein 8.3 


 


Albumin 3.5 





 (Ana Paula Figueroa)


Result Diagram:  


3/17/17 1040                                                                   

             3/17/17 1040





Imaging





Last Impressions








Head Magnetic Resonance Angiography 3/17/17 1252 Signed





Impressions: 





 Service Date/Time:  Friday, March 17, 2017 13:12 - CONCLUSION:  No acute 





 disease.       Toni Vaca Jr., MD 


 


Head CT 3/17/17 1031 Signed





Impressions: 





 Service Date/Time:  Friday, March 17, 2017 10:56 - CONCLUSION:  1. No acute 





 intracranial abnormality seen. 2. Age-related atrophy and suspected small 

vessel 





 ischemic change in the white matter.     Jon Jordan MD 


 


Carotid Artery Ultrasound 3/17/17 0000 Signed





Impressions: 





 Service Date/Time:  Friday, March 17, 2017 13:47 - CONCLUSION:  1. Mild plaque 





 involving the ICAs without hemodynamically significant stenosis. 2. Antegrade 





 flow involving both vertebral arteries.     Toni Vaca Jr., MD 





 (Ana Paula Figueroa)





Assessment and Plan


Problem List:  


(1) ESRD (end stage renal disease) on dialysis


Plan:  MWF HD, he is due today


he has permcath for HD


no electrolyte concerns


avoid IVF, gadolinium


high protein diet 


renal panel in am, he was placed on renvela 





(2) Stroke


Plan:  neurology to evaluate


he is not a candidate for TPA


CT and MRI negative, carotid scan negative


appreciate further recommendations 





(3) HIV (human immunodeficiency virus infection)


Plan:  resume HAART per home medications 


 (Ana Paula Figueroa)


Assessment and Plan


patient was seen and examined. Agree with above assessment and plan. Neurology 

workup is in progress. Patient will continue to have HD MWF.  (Jose Angel MD)





Problem Qualifiers





(1) Stroke:  


Qualified Code:  I63.9 - Cerebrovascular accident (CVA), unspecified mechanism





Ana Paula Figueroa Mar 17, 2017 16:31


Jose Angel MD Mar 17, 2017 17:40

## 2017-03-17 NOTE — HHI.HP
[facility]


Service


Family Medicine


Primary Care Physician


Cha Parada MD


Admission Diagnosis


stroke


Diagnoses:  


International Travel<30 Days:  No


Contact w/Intl Traveler<30days:  No


Known Affected Area:  No


History of Present Illness


Patient is a 77 year old male with a history of HIV and ESRD who presents by 

car to the ED after waking up with AMS. His friend is at bedside and states 

that he was totally normal around dinnertime yesterday. He was last seen at 7 

PM yesterday after leaving his friend's house and got home without incident. 

Patient said he felt fine when he went to bed approximately 10 PM, which is his 

last normal. This morning, possibly around 9 AM. He woke up to answer the phone 

and was confused stating "what I wrote didn't make sense." He also notes some 

word finding difficulty. He called a friend who is at bedside now who asserts 

that the patient had slurred speech and confusion. Could not read the newspaper 

this morning due to blurry vision. Patient states that right now he feels 

somewhat short of breath since getting to the hospital and has significantly 

dry mouth, but denies headaches, dizziness, and chest pain. Chest did "feel off

" this morning, not like pressure or pain but "I knew something was wrong." 

Denies jaw or arm pain. Denies numbness or tingling in the body, including face 

and tongue.  Better now.





Per ED physician, patient could not lift left arm during exam this morning.





CT was performed which shows no active cerebral bleed. (Aylin Cuello MD R1)





Review of Systems


Constitutional:  DENIES: Fever, Chills, Dizziness


Eyes:  COMPLAINS OF: Blurred vision,  DENIES: Double Vision


Ears, nose, mouth, throat:  DENIES: Tinnitus, Hearing loss


Cardiovascular:  DENIES: Chest pain, Syncope


Gastrointestinal:  DENIES: Abdominal pain, Black stools, Bloody stools, Diarrhea

, Nausea, Vomiting


Genitourinary:  COMPLAINS OF: Urinary frequency (dialysis)


Integumentary:  DENIES: Pruritus, Rash


Neurologic:  COMPLAINS OF: Localized weakness, Speech Problems (slurring), Poor 

Balance,  DENIES: Abnormal gait, Headache, Paresthesias, Seizures, Tremor


Psychiatric:  COMPLAINS OF: Confusion,  DENIES: Anxiety, Mood changes, 

Depression, Hallucinations (Aylin Cuello MD R1)





Past Family Social History


Past Medical History


ESRD - MWF, Dr. Maldonado


HIVDr. Brian See- ID, HIV (does not know previous CD4 count, states viral 

load)


Cardiology - Dr. Bagley (last echo documented in EMR is 2013, EF 55-60%, mild AR

)


PCP - Dr. Coyle





Denies a history of DM, CVA, MI. Denies previous clots or stents.


Reported Medications


Patient meds not reconciled. 


Takes ARVs including Sustiva? 





Reported Meds & Active Scripts


Active


Reported


Cardura (Doxazosin Mesylate) 2 Mg Tab 2 Mg PO HS


Zenpep (Pancrelipase) 25,000-85,000-136,000 Units Cap 2 Cap PO BID


Renvela (Sevelamer Carbonate) 800 Mg Tab 800 Mg PO BID


Megestrol Acetate 1 Pow Pow 800 Mg PO DAILY


Metoprolol Succinate ER 24 HR (Metoprolol Succinate) 25 Mg Tab 12.5 Mg PO BID


Midodrine 5 Mg Tab 5 Mg PO BID


Propafenone (Propafenone HCl) 150 Mg Tab 150 Mg PO BID


Crestor (Rosuvastatin Calcium) 40 Mg Tab 40 Mg PO DAILY


Diltiazem (Diltiazem HCl) 60 Mg Tab 60 Mg PO BID


 (Aylin Cuello MD R1)


Allergies:  


Coded Allergies:  


     No Known Allergies (Verified , 2/16/17)


Active Ordered Medications





 Inpatient Medications


IV Flush (NS Flush) 2 ml UNSCH  PRN IVF FLUSH AFTER USING IV ACCESS;  Start 3/17

/17 at 10:45


Family History


Family hstory of HTN


Denies family history of clotting disorders


Social History


Smoke: nonsmoker


Alcohol: denies


Illicit: denies





Lives: alone, no assistive devices, normally active


NOK: brother in [place], Alexander Callahan


Has DNR in bedroom in closet (Aylin Cuello MD R1)





Physical Exam


Vital Signs





Vital Signs








  Date Time  Temp Pulse Resp B/P Pulse Ox O2 Delivery O2 Flow Rate FiO2


 


3/17/17 10:33     94 Room Air  


 


3/17/17 10:28  98 19  98 Nasal Cannula 2 


 


3/17/17 10:18  97 16 183/99 95   


 


3/17/17 10:12 97.6 100 20 187/101 97 Room Air  








Physical Exam


GENERAL: This is a pleasant, thin,  male lying in bed in supine 

position. Friend is at bedside.


SKIN: No rashes, ecchymoses or lesions. Cool and dry. There is a recently 

placed fistula palpable on the right arm. There is a Vas-Cath placed in the 

right upper chest.


HEAD: Atraumatic. Normocephalic. Healed scar on right forehead. No temporal or 

scalp tenderness.


EYES: Arcus senilis noted bilaterally. Pupils equal round and reactive, 

approximately 2 mm. Extraocular motions intact. No scleral icterus. No 

injection or drainage. 


ENT: Nose without bleeding, purulent drainage or septal hematoma. Throat 

without erythema, tonsillar hypertrophy or exudate. Throat and oral mucosa are 

very dry. Uvula midline. Airway patent.


NECK: Trachea midline. No JVD or lymphadenopathy. Supple, nontender, no 

meningeal signs.


CARDIOVASCULAR: Regular rate and rhythm. There is a 4/6 systolic ejection 

murmur heard throughout the cardiac field, loudest at the left upper sternal 

border.


RESPIRATORY: Clear to auscultation. Breath sounds equal bilaterally. No wheezes

, rales, or rhonchi.


GASTROINTESTINAL: Abdomen soft, thin, non-tender, nondistended. No hepato-

splenomegaly, or palpable masses. No guarding.


MUSCULOSKELETAL: Extremities without clubbing, cyanosis, or edema. No joint 

tenderness, effusion, or edema noted. No calf tenderness. Negative Homans sign 

bilaterally.


NEUROLOGICAL: Awake and alert. Word-finding difficulty noted. Sensation exam to 

light touch is within normal limits on the extremities. Cranial nerves II 

through XII intact. Motor exam limited by confusion, noted to be normal once 

prompted multiple times. Four out of 5 muscle strength in all muscle groups. 

Normal speech at time of exam.


Laboratory





Laboratory Tests








Test 3/17/17





 10:40


 


White Blood Count 4.3 


 


Red Blood Count 3.39 


 


Hemoglobin 11.8 


 


Hematocrit 37.0 


 


Mean Corpuscular Volume 109.0 


 


Mean Corpuscular Hemoglobin 34.7 


 


Mean Corpuscular Hemoglobin 31.8 





Concent 


 


Red Cell Distribution Width 16.9 


 


Platelet Count 127 


 


Mean Platelet Volume 9.9 


 


Neutrophils (%) (Auto) 65.2 


 


Lymphocytes (%) (Auto) 21.1 


 


Monocytes (%) (Auto) 10.9 


 


Eosinophils (%) (Auto) 2.1 


 


Basophils (%) (Auto) 0.7 


 


Neutrophils # (Auto) 2.8 


 


Lymphocytes # (Auto) 0.9 


 


Monocytes # (Auto) 0.5 


 


Eosinophils # (Auto) 0.1 


 


Basophils # (Auto) 0.0 


 


CBC Comment DIFF FINAL 


 


Differential Comment  


 


Prothrombin Time 11.4 


 


Prothromb Time International 1.0 





Ratio 


 


Activated Partial 24.4 





Thromboplast Time 


 


Sodium Level 141 


 


Potassium Level 4.5 


 


Chloride Level 102 


 


Carbon Dioxide Level 29.8 


 


Anion Gap 9 


 


Blood Urea Nitrogen 36 


 


Creatinine 6.35 


 


Estimat Glomerular Filtration 10 





Rate 


 


Random Glucose 104 


 


Calcium Level 8.7 


 


Total Bilirubin 0.4 


 


Aspartate Amino Transf 39 





(AST/SGOT) 


 


Alanine Aminotransferase 31 





(ALT/SGPT) 


 


Alkaline Phosphatase 212 


 


Troponin I 0.06 


 


Total Protein 8.3 


 


Albumin 3.5 





 (Aylin Cuello MD R1)


Result Diagram:  


3/17/17 1040                                                                   

             3/17/17 1040





Imaging





Last Impressions








Head CT 3/17/17 1031 Signed





Impressions: 





 Service Date/Time:  Friday, March 17, 2017 10:56 - CONCLUSION:  1. No acute 





 intracranial abnormality seen. 2. Age-related atrophy and suspected small 

vessel 





 ischemic change in the white matter.     Jon Jordan MD 





 (Aylin Cuello MD R1)





Assessment and Plan


Assessment and Plan


77 year old male with a history of HIV and ESRD who presents with acute 

neurologic changes, last normal this morning, admitted for stroke work-up.


Code Status


ALTERNATIVE CODE: compressions, ACLS drugs, intubation only. No shock.


Discussed Condition With


SDW Dr. Sagar Lynn (Aylin Cuello MD R1)


Attending Attestation


THIS CASE WAS DISCUSSED WITH THE RESIDENT PHYSICIANS. I HAVE REVIEWED THE 

RECORD AND AGREE WITH THE ABOVE NOTE AND PLAN OF CARE AS WAS DISCUSSED. I HAVE 

AUTHORIZED THE ORDER FOR ADMISSION TO AN IN-PATIENT STATUS. (Eron Lynn MD)


Problem List:  


(1) Stroke


Status:  Acute


Plan:  Possible stroke given history of symptoms, however, physical exam today 

did not show any focal neurologic deficits. He possibly had a TIA. Workup as 

below:


-Patient has missed window for lytic therapy given last normal was 10 PM on 3/

16. 


-Vascular risk factors include age, HTN, likely CAD given he has a 

cardiologist. No history of CVD or MI per patient report. Last echo 2013 in 

chart showing normal EF without dyskinesia. 


-Pertinant PE findings include: Confusion, word finding difficulty. No evidence 

of weakness or focal neurologic deficit on exam.


-CT was negative for bleeding.


-MRI brain to r/o ischemic stroke.


-MRA brain and carotids ordered.


-EKG does not show evidence of atrial fibrillation or infarct, notable for 

sinus rhythm and possible left atrial enlargement. Unchanged from EKG 

documented November 2016.


- MI r/o ordered with understanding the stroke itself can cause elevated 

troponins. 


-2D ECHO orderd for the AM to look for potential wall vegetations. 


-ABCD2 score = 5, adjusting his 2 day stroke risk is 4.1%, 2 day stroke risk 5.9

%, 90 day stroke risk is 9.8%


-B/c CT scan neg., start pt. on Aspirin 325 mg x1 now and then daily.


-Start atorvastatin daily.  


-Neuro checks q4 hrs.  Tele. 


-HOB flat for 12 hrs in accordance with new [place] protocols. 


-Bedrest with fall precautions. 


-Consult neurology, rehab medicine, PT/OT/ST and case management. 


-If pt passes bedside swallow, will allow for heart healthy diet. 





DISPO:


-Meets inpatient criteria for stroke workup. 


-Discussed with pt. the importance of controlling modifiable risk factors for 

stroke such as BP ctrl, diet, exercise, and not smoking.


-Awaiting neurology rec's re: hospital course and rehab. 





(2) ESRD (end stage renal disease) on dialysis


Status:  Chronic


Plan:  Chronic. On dialysis MWF by Vas-Cath. Last dialysis session was 

Wednesday. Will need dialysis today. Nephrology consult placed.


-Continue dialysis MWF as inpatient


-Continue home meds


-Other recs per nephrology





(3) HIV (human immunodeficiency virus infection)


Status:  Chronic


Plan:  Unknown last CD4, reportedly low viral load. ID physician is Dr. See.


-Patient on ARV's, he is unsure which ones, med reconciliation not completed at 

admission


-We will determine which medications he is on and restart if indicated





(4) Aortic regurgitation


Status:  Acute


Plan:  Significant murmur noted on exam today. Last echo in EMR 2013 showing EF 

55% and mild AR. 


-Will repeat along with stroke work-up as above





(5) Fluids/Electrolytes/Nutrition/Prophylaxis 


Status:  Acute


Plan:  


Fluids: ESRD on dialysis MWF


Electrolytes: In normal limits at admission, will monitor


Nutrition: Renal diet, nothing by mouth for now given stroke workup


DVT Prophylaxis: Heparin 5000U subQ q12hr


GI Prophylaxis: Not indicated





(6) QT prolongation


Status:  Acute


Plan:  QTc 437 on EKG. 


-Avoid QT prolonging medications


-telemetry


 (Aylin Cuello MD R1)





Physician Certification


2 Midnight Certification Type:  Admission for Inpatient Services


Order for Inpatient Services


The services are ordered in accordance with Medicare regulations or non-

Medicare payer requirements, as applicable.  In the case of services not 

specified as inpatient-only, they are appropriately provided as inpatient 

services in accordance with the 2-midnight benchmark.


Estimated LOS (days):  3


 days is the estimated time the patient will need to remain in the hospital, 

assuming treatment plan goals are met and no additional complications.


Post-Hospital Plan:  Not yet determined (Aylin Cuello MD R1)





Problem Qualifiers





(1) Stroke:  


Qualified Code:  I63.9 - Cerebrovascular accident (CVA), unspecified mechanism





Aylin Cuello MD R1 Mar 17, 2017 11:56


Eron Lynn MD Mar 17, 2017 22:14

## 2017-03-17 NOTE — EKG
Date Performed: 03/17/2017       Time Performed: 09:11:53

 

PTAGE:      77 years

 

EKG:      Sinus rhythm 

 

 LEFT ATRIAL ENLARGEMENT LOW QRS VOLTAGE IN EXTREMITY LEADS ABNORMAL ECG NO SIGNIFICANT CHANGE FROM P
RIOR ELECTROCARDIOGRAM. 

 

 PREVIOUS TRACING            : 11/14/2016 19.30

 

DOCTOR:   Rosalio Park  Interpretating Date/Time  03/17/2017 18:00:11

## 2017-03-17 NOTE — RADRPT
EXAM DATE/TIME:  03/17/2017 13:12 

 

[facility] COMPARISON:     

No previous studies available for comparison.

       

 

 

INDICATIONS:     

Slurred speech. Confusion.

                     

 

MEDICAL HISTORY:     

Hypertension. HIV.   

 

SURGICAL HISTORY:     

Hernia, dialysis cath

 

ENCOUNTER:     

Initial

 

ACUITY:     

1 day

 

PAIN SCORE:     

0/10

 

LOCATION:     

Cranial 

 

TECHNIQUE:     

Multiplanar, multisequence MRI of the brain was performed without contrast.

 

FINDINGS:     

There is moderate central and cortical atrophy.  There is no restricted diffusion to suggest an ische
oswaldo event.  

There are mild to moderate periventricular white matter changes evident in the periventricular white 
matter.  

There are no extraaxial fluid collections appreciated.  

 

The midline structures are intact.  

 

I do not see any evidence for an acute parenchymal hemorrhage.  Chronic hemosiderin deposit is eviden
t.

 

CONCLUSION:     

 

Marked central and cortical atrophy with periventricular white matter changes, negative for an acute 
ischemic event. 

 

 Praneeth Barksdale MD FACR on March 17, 2017 at 13:47                

Board Certified Radiologist.

 This report was verified electronically.

## 2017-03-17 NOTE — RADRPT
EXAM DATE/TIME:  03/17/2017 13:47 

 

[facility] COMPARISON:     

No previous studies available for comparison.

        

 

 

INDICATIONS :     

Cerebrovascular accident.

                     

 

MEDICAL HISTORY :     

Hypertension. Hypercholesterolemia.   HIV.  ESRD.  Peritoneal dialysis.

 

SURGICAL HISTORY :          

Hernia repair.  Cyst removed from spine.  Vascath right chest.

 

ENCOUNTER:     

Initial

 

ACUITY:     

1 day

 

PAIN SCORE:     

0/10

 

LOCATION:     

Bilateral neck 

                     

PEAK SYSTOLIC VELOCITIES (cm/sec):

 

ICA/CCA RATIO:                    

Right: 1.6     Left: 0.8

 

ICA:                          

Right: 78     Left: 39

 

CCA:                          

Right: 50     Left: 47

 

ECA:                           

Right: 25     Left: 30

 

VERTEBRAL:           

Right: 45 antegrade     Left: 34 antegrade

   

Elevated flow velocities and ICA/CCA ratios have been found to correlate with increased degrees of

vessel stenosis, calculated as percentage of diameter relative to a normal segment of distal ICA/CCA

 

FINDINGS:     

 

RIGHT CAROTID:     

No significant stenosis is visualized.  The waveforms are within normal limits.

 

LEFT CAROTID:     

No significant stenosis is visualized.  The waveforms are within normal limits.

 

VERTEBRAL ARTERIES:  

Antegrade flow is seen in both vertebral arteries.

 

MISCELLANEOUS:     

None.

 

CONCLUSION:     

1. Mild plaque involving the ICAs without hemodynamically significant stenosis.

2. Antegrade flow involving both vertebral arteries.

 

 

 

 Toni Vaca Jr., MD on March 17, 2017 at 14:25           

Board Certified Radiologist.

 This report was verified electronically.

## 2017-03-18 VITALS
TEMPERATURE: 97.1 F | RESPIRATION RATE: 18 BRPM | SYSTOLIC BLOOD PRESSURE: 165 MMHG | OXYGEN SATURATION: 98 % | HEART RATE: 91 BPM | DIASTOLIC BLOOD PRESSURE: 93 MMHG

## 2017-03-18 VITALS
TEMPERATURE: 98.2 F | HEART RATE: 92 BPM | RESPIRATION RATE: 20 BRPM | SYSTOLIC BLOOD PRESSURE: 138 MMHG | DIASTOLIC BLOOD PRESSURE: 84 MMHG | OXYGEN SATURATION: 99 %

## 2017-03-18 VITALS
OXYGEN SATURATION: 99 % | HEART RATE: 101 BPM | RESPIRATION RATE: 17 BRPM | TEMPERATURE: 99 F | SYSTOLIC BLOOD PRESSURE: 181 MMHG | DIASTOLIC BLOOD PRESSURE: 103 MMHG

## 2017-03-18 VITALS
RESPIRATION RATE: 18 BRPM | SYSTOLIC BLOOD PRESSURE: 156 MMHG | HEART RATE: 90 BPM | OXYGEN SATURATION: 98 % | DIASTOLIC BLOOD PRESSURE: 89 MMHG | TEMPERATURE: 98.1 F

## 2017-03-18 VITALS
SYSTOLIC BLOOD PRESSURE: 160 MMHG | TEMPERATURE: 98.8 F | HEART RATE: 96 BPM | RESPIRATION RATE: 17 BRPM | OXYGEN SATURATION: 99 % | DIASTOLIC BLOOD PRESSURE: 100 MMHG

## 2017-03-18 VITALS
HEART RATE: 98 BPM | SYSTOLIC BLOOD PRESSURE: 170 MMHG | RESPIRATION RATE: 18 BRPM | DIASTOLIC BLOOD PRESSURE: 101 MMHG | TEMPERATURE: 98.9 F | OXYGEN SATURATION: 100 %

## 2017-03-18 VITALS — HEART RATE: 91 BPM

## 2017-03-18 LAB
ALP SERPL-CCNC: 165 U/L (ref 45–117)
ALT SERPL-CCNC: 27 U/L (ref 12–78)
ANION GAP SERPL CALC-SCNC: 9 MEQ/L (ref 5–15)
AST SERPL-CCNC: 33 U/L (ref 15–37)
BASOPHILS # BLD AUTO: 0 TH/MM3 (ref 0–0.2)
BASOPHILS NFR BLD: 0.9 % (ref 0–2)
BILIRUB SERPL-MCNC: 0.4 MG/DL (ref 0.2–1)
BUN SERPL-MCNC: 29 MG/DL (ref 7–18)
CHLORIDE SERPL-SCNC: 101 MEQ/L (ref 98–107)
EOSINOPHIL # BLD: 0.1 TH/MM3 (ref 0–0.4)
EOSINOPHIL NFR BLD: 1.6 % (ref 0–4)
ERYTHROCYTE [DISTWIDTH] IN BLOOD BY AUTOMATED COUNT: 16.7 % (ref 11.6–17.2)
GFR SERPLBLD BASED ON 1.73 SQ M-ARVRAT: 13 ML/MIN (ref 89–?)
HCO3 BLD-SCNC: 29 MEQ/L (ref 21–32)
HCT VFR BLD CALC: 35.3 % (ref 39–51)
HDLC SERPL-MCNC: 71.9 MG/DL (ref 40–60)
HEMO FLAGS: (no result)
LDLC SERPL-MCNC: 110 MG/DL (ref 0–99)
LYMPHOCYTES # BLD AUTO: 1 TH/MM3 (ref 1–4.8)
LYMPHOCYTES NFR BLD AUTO: 21.3 % (ref 9–44)
MCH RBC QN AUTO: 34.7 PG (ref 27–34)
MCHC RBC AUTO-ENTMCNC: 32.4 % (ref 32–36)
MCV RBC AUTO: 107.3 FL (ref 80–100)
MONOCYTES NFR BLD: 10.6 % (ref 0–8)
NEUTROPHILS # BLD AUTO: 3.1 TH/MM3 (ref 1.8–7.7)
NEUTROPHILS NFR BLD AUTO: 65.6 % (ref 16–70)
PLATELET # BLD: 115 TH/MM3 (ref 150–450)
POTASSIUM SERPL-SCNC: 4.9 MEQ/L (ref 3.5–5.1)
RBC # BLD AUTO: 3.29 MIL/MM3 (ref 4.5–5.9)
SODIUM SERPL-SCNC: 139 MEQ/L (ref 136–145)
WBC # BLD AUTO: 4.8 TH/MM3 (ref 4–11)

## 2017-03-18 RX ADMIN — SODIUM CHLORIDE, PRESERVATIVE FREE SCH ML: 5 INJECTION INTRAVENOUS at 22:49

## 2017-03-18 RX ADMIN — INSULIN ASPART SCH: 100 INJECTION, SOLUTION INTRAVENOUS; SUBCUTANEOUS at 21:00

## 2017-03-18 RX ADMIN — INSULIN ASPART SCH: 100 INJECTION, SOLUTION INTRAVENOUS; SUBCUTANEOUS at 16:00

## 2017-03-18 RX ADMIN — HEPARIN SODIUM SCH UNITS: 10000 INJECTION, SOLUTION INTRAVENOUS; SUBCUTANEOUS at 13:40

## 2017-03-18 RX ADMIN — FOLIC ACID SCH MG: 1 TABLET ORAL at 09:54

## 2017-03-18 RX ADMIN — CALCIUM CARBONATE-CHOLECALCIFEROL TAB 250 MG-125 UNIT SCH MG: 250-125 TAB at 22:46

## 2017-03-18 RX ADMIN — INSULIN ASPART SCH: 100 INJECTION, SOLUTION INTRAVENOUS; SUBCUTANEOUS at 05:42

## 2017-03-18 RX ADMIN — CALCIUM CARBONATE-CHOLECALCIFEROL TAB 250 MG-125 UNIT SCH MG: 250-125 TAB at 09:54

## 2017-03-18 RX ADMIN — PRAVASTATIN SODIUM SCH MG: 40 TABLET ORAL at 22:46

## 2017-03-18 RX ADMIN — SODIUM CHLORIDE, PRESERVATIVE FREE SCH ML: 5 INJECTION INTRAVENOUS at 09:00

## 2017-03-18 RX ADMIN — POTASSIUM CHLORIDE SCH MEQ: 750 CAPSULE, EXTENDED RELEASE ORAL at 09:54

## 2017-03-18 RX ADMIN — ASPIRIN 81 MG SCH MG: 81 TABLET ORAL at 09:54

## 2017-03-18 RX ADMIN — LEVOTHYROXINE SODIUM SCH MCG: 200 TABLET ORAL at 05:43

## 2017-03-18 RX ADMIN — METOPROLOL TARTRATE SCH MG: 25 TABLET, FILM COATED ORAL at 22:46

## 2017-03-18 RX ADMIN — SEVELAMER CARBONATE SCH MG: 800 TABLET, FILM COATED ORAL at 09:54

## 2017-03-18 RX ADMIN — HEPARIN SODIUM SCH UNITS: 10000 INJECTION, SOLUTION INTRAVENOUS; SUBCUTANEOUS at 02:35

## 2017-03-18 RX ADMIN — METOPROLOL TARTRATE SCH MG: 25 TABLET, FILM COATED ORAL at 09:54

## 2017-03-18 RX ADMIN — INSULIN ASPART SCH: 100 INJECTION, SOLUTION INTRAVENOUS; SUBCUTANEOUS at 11:00

## 2017-03-18 NOTE — HHI.DCPOC
Discharge Care Plan


Diagnosis:  


(1) TIA (transient ischemic attack)


(2) QT prolongation


(3) Aortic regurgitation


(4) ESRD (end stage renal disease) on dialysis


(5) HIV (human immunodeficiency virus infection)


Goals to Promote Your Health


* To prevent worsening of your condition and complications


* To maintain your health at the optimal level


Directions to Meet Your Goals


*** Take your medications as prescribed


*** Follow your dietary instruction


*** Follow activity as directed








*** Keep your appointments as scheduled


*** Take your immunizations and boosters as scheduled


*** If your symptoms worsen call your PCP, if no PCP go to Urgent Care Center 

or Emergency Room***


*** Smoking is Dangerous to Your Health. Avoid second hand smoke***


***Call the 24-hour hour crisis hotline for domestic abuse at 1-359.191.3785***








Aylin Cuello MD R1 Mar 18, 2017 07:07

## 2017-03-18 NOTE — HHI.FPPN
Subjective


Remarks


Patient reports no new concerns today. He is ready to get out of the bed today. 

He denies any weakness or dizziness symptoms. He denies chest pain, shortness 

of breath.





Objective


Vitals





 Vital Signs








  Date Time  Temp Pulse Resp B/P Pulse Ox O2 Delivery O2 Flow Rate FiO2


 


3/18/17 08:00 98.9 98 18 170/101 100   


 


3/18/17 04:31 99.0 101 17 181/103 99   


 


3/18/17 00:59 98.8 96 17 160/100 99   


 


3/17/17 20:27 97.1 96 17 180/102 97   


 


3/17/17 18:22     97 Nasal Cannula 2.00 


 


3/17/17 18:00  90      


 


3/17/17 16:00 97.1 88 18 168/104 97   


 


3/17/17 13:54  92 18 182/99 96 Nasal Cannula 2 


 


3/17/17 13:53     96 Nasal Cannula 2.00 


 


3/17/17 12:35  94 14 184/98 97 Nasal Cannula 2 


 


3/17/17 10:33     94 Room Air  


 


3/17/17 10:28  98 19  98 Nasal Cannula 2 


 


3/17/17 10:18  97 16 183/99 95   








 I/O








 3/17/17 3/17/17 3/17/17 3/18/17 3/18/17 3/18/17





 07:00 15:00 23:00 07:00 15:00 23:00


 


Intake Total    360 ml  


 


Output Total   2000 ml   


 


Balance   -2000 ml 360 ml  


 


      


 


Intake Oral    360 ml  


 


Output Hemodialysis   2000 ml   








Result Diagram:  


3/18/17 0755                                                                   

             3/18/17 0755





Imaging





Last Impressions








Head Magnetic Resonance Angiography 3/17/17 1252 Signed





Impressions: 





 Service Date/Time:  Friday, March 17, 2017 13:12 - CONCLUSION:  No acute 





 disease.       Toni Vaca Jr., MD 


 


Head CT 3/17/17 1031 Signed





Impressions: 





 Service Date/Time:  Friday, March 17, 2017 10:56 - CONCLUSION:  1. No acute 





 intracranial abnormality seen. 2. Age-related atrophy and suspected small 

vessel 





 ischemic change in the white matter.     Jon Jordan MD 


 


Carotid Artery Ultrasound 3/17/17 0000 Signed





Impressions: 





 Service Date/Time:  Friday, March 17, 2017 13:47 - CONCLUSION:  1. Mild plaque 





 involving the ICAs without hemodynamically significant stenosis. 2. Antegrade 





 flow involving both vertebral arteries.     Toni Vaca Jr., MD 


 


Brain MRI 3/17/17 0000 Signed





Impressions: 





 Service Date/Time:  Friday, March 17, 2017 13:12 - CONCLUSION:   Marked 

central 





 and cortical atrophy with periventricular white matter changes, negative for 

an 





 acute ischemic event.    Praneeth Barksdale MD  FACR








Objective Remarks


GENERAL: This is a pleasant, thin, male lying in bed in no apparent distress.


SKIN: No rashes, ecchymoses or lesions. Cool and dry. There is a recently 

placed fistula palpable on the right arm. There is a Vas-Cath placed in the 

right upper chest without evidence of bleeding or infection.


HEAD: Atraumatic. Normocephalic. Healed scar on right forehead. No temporal or 

scalp tenderness.


EYES: Arcus senilis noted bilaterally. Pupils equal round and reactive, normal 

size, extraocular motions intact though exam is limited by patient cooperation. 

No scleral icterus. No injection or drainage. 


ENT: Nose without bleeding, purulent drainage or septal hematoma. Throat 

without erythema, tonsillar hypertrophy or exudate. Throat and oral mucosa 

moist today. Uvula midline. Airway patent.


NECK: Trachea midline. No JVD or lymphadenopathy. Supple, nontender, no 

meningeal signs.


CARDIOVASCULAR: Regular rate and rhythm. There is a 4/6 holosystolic ejection 

murmur heard throughout the cardiac field, loudest at the left upper sternal 

border.


RESPIRATORY: Clear to auscultation. Breath sounds equal bilaterally. No wheezes

, rales, or rhonchi.


GASTROINTESTINAL: Abdomen soft, thin, non-tender, nondistended. No hepato-

splenomegaly, or palpable masses. No guarding.


MUSCULOSKELETAL: Extremities without clubbing, cyanosis, or edema. No joint 

tenderness, effusion, or edema noted. No calf tenderness. Negative Homans sign 

bilaterally.


NEUROLOGICAL: Awake and alert. Word-finding difficulty noted. Sensation exam to 

light touch is within normal limits on the extremities. Cranial nerves II 

through XII intact. Motor exam normal. Normal pronator drift. Four out of 5 

muscle strength in all muscle groups. Normal speech at time of exam.


Medications and IVs





 Inpatient Medications


Acetaminophen (Tylenol) 650 mg UNSCH  PRN PO for headach, pain, temp > 101F;  

Start 3/17/17 at 15:15


Albumin Human (Albumin 25% Inj) 25 gm UNSCH  PRN IV WITH DIALYSIS;  Start 3/17/

17 at 15:15


Aspirin (Aspirin Chew) 81 mg DAILY PO  Last administered on 3/18/17at 09:54;  

Start 3/18/17 at 09:00


Aspirin (Aspirin) 325 mg DAILY PO  Last administered on 3/17/17at 13:55;  Start 

3/17/17 at 13:00;  Stop 3/17/17 at 17:51;  Status DC


Calcium/Vitamin D (Oscal-D 250-125) 500 mg BID PO  Last administered on 3/18/

17at 09:54;  Start 3/17/17 at 21:00


Clonidine (Catapres) 0.1 mg UNSCH  PRN PO for BP > 180/100 X 2 readings;  Start 

3/17/17 at 15:15


Dextrose (D50w (Vial) Inj) 25 ml UNSCH  PRN IV PUSH HYPOGLYCEMIA-SEE COMMENTS;  

Start 3/17/17 at 12:45


Diphenhydramine HCl (Benadryl) 25 mg UNSCH  PRN PO for hives/itching/anaphylaxis

;  Start 3/17/17 at 15:15


Enalaprilat (Vasotec  Inj) 1.25 mg Q4H  PRN IV For SBP > 220 or DBP > 120;  

Start 3/17/17 at 12:45


Epoetin Derek (Epogen Inj) 5,000 units UNSCH  PRN IV WITH DIALYSIS Last 

administered on 3/17/17at 19:29;  Start 3/17/17 at 15:15


Folic Acid (Folate) 1 mg DAILY PO  Last administered on 3/18/17at 09:54;  Start 

3/18/17 at 09:00


Gelatin (Gelfoam 12 Mm/7 Mm Top) 1 foam UNSCH  PRN TOP SEE LABEL COMMENTS;  

Start 3/17/17 at 15:15


Gentamicin Sulfate (Gentamicin (Dialysis) Inj) 20 mg UNSCH  PRN IV WITH 

DIALYSIS Last administered on 3/17/17at 19:30;  Start 3/17/17 at 15:15


Glucagon (Glucagon Inj) 1 mg UNSCH  PRN IM/SQ HYPOGLYCEMIA-SEE COMMENTS;  Start 

3/17/17 at 12:45


Heparin Sodium (Porcine) (Heparin Inj)  UNSCH  PRN .XX WITH DIALYSIS Last 

administered on 3/17/17at 19:30;  Start 3/17/17 at 15:15


Heparin Sodium (Porcine) 8000 units 8,000 units UNSCH  PRN IVF WITH DIALYSIS;  

Start 3/17/17 at 15:15


Insulin Aspart (NovoLOG SUPPLEMENTAL SCALE) 1 ACHS SLIDING  SCALE SQ ;  Start 3/

17/17 at 16:00


IV Flush (NS Flush) 5 ml UNSCH  PRN IVF WITH DIALYSIS;  Start 3/17/17 at 15:15


Levothyroxine Sodium (Synthroid) 200 mcg DAILY@0600 PO  Last administered on 3/

18/17at 05:43;  Start 3/18/17 at 06:00


Mannitol (Mannitol Inj) 12.5 gm UNSCH  PRN IV WITH DIALYSIS;  Start 3/17/17 at 

15:15


Metoprolol Tartrate (Lopressor) 25 mg BID PO  Last administered on 3/18/17at 09:

54;  Start 3/18/17 at 09:00


Nitroglycerin (Nitrostat Sl) 0.4 mg UNSCH  PRN SL CHEST PAIN;  Start 3/17/17 at 

15:15


Ondansetron HCl (Zofran Inj) 4 mg UNSCH  PRN IV WITH DIALYSIS;  Start 3/17/17 

at 15:15


Potassium Chloride (KCl) 10 meq DAILY PO  Last administered on 3/18/17at 09:54;

  Start 3/18/17 at 09:00


Pravastatin Sodium (Pravachol) 40 mg HS PO  Last administered on 3/17/17at 23:06

;  Start 3/17/17 at 21:00


Sevelamer Carbonate (Renvela) 800 mg DAILY PO  Last administered on 3/18/17at 09

:54;  Start 3/18/17 at 09:00


Sodium Chloride (NS 1000 ml Inj) 1,000 ml @  0 mls/hr Q0M PRN IV WITH DIALYSIS;

  Start 3/17/17 at 15:02


Urinary Catheter:  No


Vascular Central Line Catheter:  No





A/P


Assessment and Plan


77 year old male with a history of HIV and ESRD who presents with acute 

neurologic changes, last normal this morning, admitted for stroke work-up.


Discharge Planning


Possible today or tomorrow, pending echo and PT assessment


Problem List:  


(1) Neurologic abnormality


Status:  Acute


Plan:  Acute CVA vs. TIA on admission, with workup more indicative of TIA given 

resolution of neurologic symptoms within 24 hours and negative imaging workup. 

We will commence with intensive medical management of risk factors and continue 

workup as follows:





-CT was negative for bleeding but with diffuse white matter changes


-MRI/MRA brain to r/o ischemic stroke: 


      MRA 3/17 showing excellent visualization of the major intracranial 

arteries was no evidence of aneurysm, vessel truncation, stenosis, or vascular 

malformation


      MRI 3/17: Marked central and cortical atrophy with periventricular white 

matter changes, negative for ischemic event.


-Carotid US 3/17: Involving ICAs without hemodynamically significant stenosis. 

Anterograde flow normal in vertebral arteries.


-EKGs trended, do not show evidence of atrial fibrillation or infarct, notable 

for sinus rhythm and possible left atrial enlargement. Unchanged from EKG 

documented November 2016.


- MI r/o ordered with understanding the stroke itself can cause elevated 

troponins. Troponins were 0.06, 0.07, 0.08, not indicative of acute cardiac 

ischemic event


-2D ECHO ordered and pending





- Aspirin 325mg once, will continue aspirin 81 mg daily


-On pravastatin 40 mg at home. We'll continue


-Neuro checks q4 hrs. Tele - no acute events.


-We'll DC head of bed flat given no evidence of stroke.


-Out of bed with assistance


-Consult neurology, rehab medicine, PT/OT/ST and case management. 


-Heart healthy diet





(2) ESRD (end stage renal disease) on dialysis


Status:  Chronic


Plan:  Chronic. On dialysis MWF by Vas-Cath. Dialysis session completed on 3/17/

17


-Continue dialysis MWF as inpatient


-Continue home meds


-Other recs per nephrology





(3) HIV (human immunodeficiency virus infection)


Status:  Chronic


Plan:  Unknown last CD4, reportedly low viral load. ID physician is Dr. See.


-Patient on ARV's, he is unsure which ones, med reconciliation not completed at 

admission


-We will determine which medications he is on and restart if indicated





(4) Aortic regurgitation


Status:  Acute


Plan:  Significant murmur noted on exam today. Last echo in EMR 2013 showing EF 

55% and mild AR. 


-Will repeat along with stroke work-up as above





(5) QT prolongation


Status:  Acute


Plan:  QTc 438 on admission EKG. 


-Avoid QT prolonging medications


-telemetry





(6) HTN (hypertension)


Status:  Acute


Plan:  Chronic. Admit her for stroke workup therefore permissive hypertension 

was allowed. No evidence of stroke, therefore, will continue home medications 

at this time. BPs 160s to 180 systolic.


-Restart home metoprolol 25 mg twice a day on 3/18


-Clonidine and Vasotec ordered PRN





(7) Fluids/Electrolytes/Nutrition/Prophylaxis 


Status:  Acute


Plan:  


Fluids: ESRD on dialysis MWF


Electrolytes: within normal limits at admission, will monitor


Nutrition: Renal diet, nothing by mouth for now given stroke workup


DVT Prophylaxis: Heparin 5000U subQ q12hr


GI Prophylaxis: Not indicated











Aylin Cuello MD R1 Mar 18, 2017 10:27

## 2017-03-18 NOTE — EC
Study

 

Study Date:03/18/2017

 

 

 

STUDY CONCLUSIONS

 

SUMMARY

 

- Left ventricle: The cavity size was normal. Wall thickness was

normal. Systolic function was normal. The estimated ejection

fraction was in the range of 55% to 60%. Wall motion was normal;

there were no regional wall motion abnormalities.

- Aortic valve: Mild regurgitation. Valve area: 1.73cm^2(VTI).

Valve area: 1.79cm^2 (Vmax).

- Mitral valve: Severe regurgitation.

- Left atrium: The atrium was mildly dilated.

- Right atrium: The atrium was mildly dilated.

- Tricuspid valve: Moderate regurgitation.

- Pulmonary arteries: Systolic pressure was severely increased. PA

peak pressure: 85mm Hg (S).

- Pericardium, extracardiac: There was a left pleural effusion.

 

-------------------------------------------------------------------

If LV function is below 40, please consider prescribing an ACEI or

ARB or document rationale for non-use.

 

-------------------------------------------------------------------

PROCEDURE DATA

 

STUDY STATUS:

Elective. Procedure: Transthoracic echocardiography.

Image quality was good. Scanning was performed from the

parasternal, apical, and subcostal acoustic windows. Study

completion: The patient tolerated the procedure well.

Transthoracic echocardiography. M-mode, complete 2D, complete

spectral Doppler, and color Doppler. Height: Height: 69in. Weight:

Weight: 140.7lb. Body mass index: BMI: 20.8kg/m^2. Body surface

area:   BSA: 1.78m^2. Patient status: Inpatient.

 

-------------------------------------------------------------------

CARDIAC ANATOMY

 

LEFT VENTRICLE:

The cavity size was normal. Wall thickness was

normal. Systolic function was normal. The estimated ejection

fraction was in the range of 55% to 60%. Wall motion was normal;

there were no regional wall motion abnormalities.

 

AORTIC VALVE:

Trileaflet; mildly thickened, mildly calcified

leaflets. Doppler: Transvalvular velocity was within the normal

range. There was no stenosis. Mild regurgitation.  Valve area:

1.73cm^2(VTI). Indexed valve area: 0.97cm^2/m^2 (VTI). Valve area:

1.79cm^2 (Vmax). Indexed valve area: 1.01cm^2/m^2 (Vmax).  Mean

gradient: 5mm Hg (S).

 

AORTA:

Aortic root: The aortic root was normal in size.

 

MITRAL VALVE:

Mildly thickened leaflets, . Doppler: Transvalvular

velocity was within the normal range. There was no evidence for

stenosis. Severe regurgitation.  Peak gradient: 3mm Hg (D).

 

LEFT ATRIUM:

The atrium was mildly dilated.

 

RIGHT VENTRICLE:

The cavity size was normal. Wall thickness was

normal.

 

PULMONIC VALVE:

Poorly visualized. Doppler: Transvalvular velocity

was within the normal range. There was no evidence for stenosis. No

regurgitation.

 

TRICUSPID VALVE:

Structurally normal valve. Doppler: Transvalvular

velocity was within the normal range. Moderate regurgitation.

 

PULMONARY ARTERY:

The main pulmonary artery was normal-sized.

Systolic pressure was severely increased.

 

RIGHT ATRIUM:

The atrium was mildly dilated.

 

PERICARDIUM:

There was no pericardial effusion.

 

SYSTEMIC VEINS:

Inferior vena cava: The vessel was dilated.

Pleura: There was a left pleural effusion.

 

-------------------------------------------------------------------

 

Patient weight: 140.7lb

_Ejection fraction:_ 65-75%

_Fractional shortening:_ 32%

up to 5Kg 5-11.5Kg 11.6-22.9Kg 23-45Kg 45-57Kg

Aortic Root 7-13      <17      13-22       17-27   17-27

LA diam     6-13      <23      24-38       33-47   37-40

RVID        10-17     7-15     7-15        7-18    8-17

LVIDd       12-22     <32      24-38       33-47   37-40

LVPW        2-4       3-6      5-7         6-8     7-8

IVS         2-4       3-6      5-7         6-8     7-8

 

-------------------------------------------------------------------

 

BASIC MEASUREMENTS                                     ADULT NORMAL

Left ventricle

LV internal dimension, ED, chordal       43.2 mm       43-52

level, PLAX

LV internal dimension, ES, chordal       31.8 mm       23-38

level, PLAX

Fractional shortening, chordal level,     *26 %        >29

PLAX

LV posterior wall thickness, ED          11.2 mm       ------------

IVS/LVPW ratio, ED                       0.99          <1.3

Ventricular septum

Septal thickness, ED                     11.1 mm       ------------

Aortic valve

Leaflet separation                         15 mm       15-26

Aorta

Root diameter, ED                          32 mm       ------------

Left atrium

Anterior-posterior dimension               32 mm       ------------

Anterior-posterior dimension index        1.8 cm/m^2   <2.2

 

BASIC MEASUREMENTS                                     ADULT NORMAL

Aortic valve

Leaflet separation                         15 mm       15-26

 

DOPPLER MEASUREMENTS                                   ADULT NORMAL

Main pulmonary artery

Pressure, S                               *85 mm Hg    =30

Aortic valve

Peak velocity, S                          138 cm/s     ------------

Mean velocity, S                          106 cm/s     ------------

VTI, S                                     26 cm       ------------

Mean gradient, S                            5 mm Hg    ------------

Valve area, VTI                          1.73 cm^2     ------------

Valve area index, VTI                    0.97 cm^2/m^2 ------------

Valve area, Vmax                         1.79 cm^2     ------------

Valve area index, Vmax                   1.01 cm^2/m^2 ------------

Regurgitant velocity, ED                  565 cm/s     ------------

Regurgitant deceleration                 4160 cm/s^2   ------------

Regurgitant pressure half-time            409 ms       ------------

Regurgitant gradient, ED                  128 mm Hg    ------------

Mitral valve

Peak E-wave velocity                     88.1 cm/s     ------------

Peak A-wave velocity                      106 cm/s     ------------

Deceleration time                         180 ms       150-230

Peak gradient, D                            3 mm Hg    ------------

Peak E/A ratio                            0.8          ------------

Tricuspid valve

Regurgitant peak velocity                 411 cm/s     ------------

Peak RV-RA gradient, S                     68 mm Hg    ------------

Maximal regurgitant velocity              411 cm/s     ------------

Systemic veins

Estimated CVP                              10 mm Hg    ------------

Right ventricle

RV pressure, S                            *86 mm Hg    <30

Pulmonic valve

Peak velocity, S                         49.7 cm/s     ------------

 

LEGEND:

Mean values are shown as u=mean value.

Asterisk (*) marks values outside specified normal range.

Prepared and signed by

 

Rosalio Park

[id number]

## 2017-03-18 NOTE — HHI.NPPN
Subjective


History of Present Illness


76 y/o dialysis pt who gets treatment M-W-F. Apparently on admission he 

developed dysarthria, difficulty writing, and had blurry vision. He was brought 

in for evaluation. PMH of HTN, HIV, he was on PD but did poorly and converted 

to hemodialysis.


Additional Remarks


Patient is alert, sitting on chair, not in distress.





Objective Data


Data











 3/17/17 3/18/17





 19:00 07:00


 


Intake Total  360 ml


 


Output Total  2000 ml


 


Balance  -1640 ml


 


  


 


Intake Oral  360 ml


 


Output Hemodialysis  2000 ml











 Vital Signs








  Date Time  Temp Pulse Resp B/P Pulse Ox O2 Delivery O2 Flow Rate FiO2


 


3/18/17 08:00 98.9 98 18 170/101 100   


 


3/18/17 04:31 99.0 101 17 181/103 99   


 


3/18/17 00:59 98.8 96 17 160/100 99   


 


3/17/17 20:27 97.1 96 17 180/102 97   


 


3/17/17 18:22     97 Nasal Cannula 2.00 


 


3/17/17 18:00  90      


 


3/17/17 16:00 97.1 88 18 168/104 97   


 


3/17/17 13:54  92 18 182/99 96 Nasal Cannula 2 


 


3/17/17 13:53     96 Nasal Cannula 2.00 


 


3/17/17 12:35  94 14 184/98 97 Nasal Cannula 2 








-:  


3/18/17 0755                                                                   

             3/18/17 0755








Physical Exam


General


Appearance:  No Acute Distress, Comfortable





Eyes


Eye Exam:  Pupils Equal





Throat


Throat Exam:  Oral Mucosa Pink & Moist





Neck


Neck Exam:  Neck Supple





Pulmonary


Resp Exam:  Breath Sounds Equal, No Distress, Rhonchi, Decreased Bases





Cardiology


CV Exam:  Regular, Normal Sinus Rhythm





Gastrointestinal/Abdomen


GI Exam:  Soft, Non-Tender, Bowel Sounds Present





Extremeties


Extremities Exam:  Trace Edema





Neurologic


Neuro Exam:  Alert, Awake





Psychiatric


Psych Exam:  Appropriate Responses





Assessment/Plan


Problem List:  


(1) ESRD (end stage renal disease) on dialysis


Plan:  MWF HD was done yesterday.


he has PermCath for HD


no electrolyte concerns


avoid IVF, gadolinium


high protein diet 


renal panel in am, he was placed on renvela.


Continue HD MWF.





(2) Stroke


Plan:  neurology to evaluate


he is not a candidate for TPA


CT and MRI negative, carotid scan negative


appreciate further recommendations 





(3) HIV (human immunodeficiency virus infection)


Plan:  resume HAART per home medications 








Problem Qualifiers





(1) Stroke:  


Qualified Code:  I63.9 - Cerebrovascular accident (CVA), unspecified mechanism





Heike Mustafa MD Mar 18, 2017 12:13

## 2017-03-19 VITALS
HEART RATE: 84 BPM | RESPIRATION RATE: 18 BRPM | SYSTOLIC BLOOD PRESSURE: 157 MMHG | DIASTOLIC BLOOD PRESSURE: 97 MMHG | OXYGEN SATURATION: 100 % | TEMPERATURE: 98.8 F

## 2017-03-19 VITALS
RESPIRATION RATE: 20 BRPM | OXYGEN SATURATION: 98 % | HEART RATE: 86 BPM | DIASTOLIC BLOOD PRESSURE: 94 MMHG | TEMPERATURE: 97.9 F | SYSTOLIC BLOOD PRESSURE: 165 MMHG

## 2017-03-19 VITALS
OXYGEN SATURATION: 99 % | DIASTOLIC BLOOD PRESSURE: 91 MMHG | HEART RATE: 88 BPM | SYSTOLIC BLOOD PRESSURE: 162 MMHG | RESPIRATION RATE: 20 BRPM | TEMPERATURE: 98.3 F

## 2017-03-19 VITALS
TEMPERATURE: 97.4 F | HEART RATE: 83 BPM | DIASTOLIC BLOOD PRESSURE: 83 MMHG | SYSTOLIC BLOOD PRESSURE: 154 MMHG | RESPIRATION RATE: 18 BRPM | OXYGEN SATURATION: 98 %

## 2017-03-19 VITALS — HEART RATE: 83 BPM

## 2017-03-19 LAB
ANION GAP SERPL CALC-SCNC: 11 MEQ/L (ref 5–15)
BASOPHILS # BLD AUTO: 0 TH/MM3 (ref 0–0.2)
BASOPHILS NFR BLD: 1 % (ref 0–2)
BUN SERPL-MCNC: 40 MG/DL (ref 7–18)
CHLORIDE SERPL-SCNC: 100 MEQ/L (ref 98–107)
EOSINOPHIL # BLD: 0.1 TH/MM3 (ref 0–0.4)
EOSINOPHIL NFR BLD: 2.1 % (ref 0–4)
ERYTHROCYTE [DISTWIDTH] IN BLOOD BY AUTOMATED COUNT: 17 % (ref 11.6–17.2)
GFR SERPLBLD BASED ON 1.73 SQ M-ARVRAT: 10 ML/MIN (ref 89–?)
HCO3 BLD-SCNC: 26 MEQ/L (ref 21–32)
HCT VFR BLD CALC: 34.2 % (ref 39–51)
HEMO FLAGS: (no result)
HEMOGLOBIN A1A: 0.8 %
HEMOGLOBIN A1B: 1.5 %
HEMOGLOBIN AO: 86.9 %
HEMOGLOBIN LA1C: 2 %
HEMOGLOBIN P3: 5 %
LYMPHOCYTES # BLD AUTO: 1 TH/MM3 (ref 1–4.8)
LYMPHOCYTES NFR BLD AUTO: 20.7 % (ref 9–44)
MAGNESIUM SERPL-MCNC: 2.5 MG/DL (ref 1.5–2.5)
MCH RBC QN AUTO: 35 PG (ref 27–34)
MCHC RBC AUTO-ENTMCNC: 32.3 % (ref 32–36)
MCV RBC AUTO: 108.2 FL (ref 80–100)
MONOCYTES NFR BLD: 10.5 % (ref 0–8)
NEUTROPHILS # BLD AUTO: 3.1 TH/MM3 (ref 1.8–7.7)
NEUTROPHILS NFR BLD AUTO: 65.7 % (ref 16–70)
PLATELET # BLD: 119 TH/MM3 (ref 150–450)
POTASSIUM SERPL-SCNC: 5.8 MEQ/L (ref 3.5–5.1)
RBC # BLD AUTO: 3.16 MIL/MM3 (ref 4.5–5.9)
SODIUM SERPL-SCNC: 137 MEQ/L (ref 136–145)
WBC # BLD AUTO: 4.8 TH/MM3 (ref 4–11)

## 2017-03-19 RX ADMIN — SEVELAMER CARBONATE SCH MG: 800 TABLET, FILM COATED ORAL at 08:31

## 2017-03-19 RX ADMIN — INSULIN ASPART SCH: 100 INJECTION, SOLUTION INTRAVENOUS; SUBCUTANEOUS at 06:15

## 2017-03-19 RX ADMIN — CALCIUM CARBONATE-CHOLECALCIFEROL TAB 250 MG-125 UNIT SCH MG: 250-125 TAB at 08:31

## 2017-03-19 RX ADMIN — FOLIC ACID SCH MG: 1 TABLET ORAL at 08:31

## 2017-03-19 RX ADMIN — INSULIN ASPART SCH: 100 INJECTION, SOLUTION INTRAVENOUS; SUBCUTANEOUS at 11:00

## 2017-03-19 RX ADMIN — HEPARIN SODIUM SCH UNITS: 10000 INJECTION, SOLUTION INTRAVENOUS; SUBCUTANEOUS at 13:30

## 2017-03-19 RX ADMIN — METOPROLOL TARTRATE SCH MG: 25 TABLET, FILM COATED ORAL at 08:31

## 2017-03-19 RX ADMIN — POTASSIUM CHLORIDE SCH MEQ: 750 CAPSULE, EXTENDED RELEASE ORAL at 08:31

## 2017-03-19 RX ADMIN — SODIUM CHLORIDE, PRESERVATIVE FREE SCH ML: 5 INJECTION INTRAVENOUS at 08:31

## 2017-03-19 RX ADMIN — HEPARIN SODIUM SCH UNITS: 10000 INJECTION, SOLUTION INTRAVENOUS; SUBCUTANEOUS at 02:58

## 2017-03-19 RX ADMIN — LEVOTHYROXINE SODIUM SCH MCG: 200 TABLET ORAL at 06:13

## 2017-03-19 RX ADMIN — ASPIRIN 81 MG SCH MG: 81 TABLET ORAL at 08:31

## 2017-03-19 NOTE — HHI.NPPN
Subjective


History of Present Illness


78 y/o dialysis pt who gets treatment M-W-F. Apparently on admission he 

developed dysarthria, difficulty writing, and had blurry vision. He was brought 

in for evaluation. PMH of HTN, HIV, he was on PD but did poorly and converted 

to hemodialysis.


Additional Remarks


Patient is alert, no SOB, feeling well.





Objective Data


Data











 3/18/17 3/19/17





 19:00 07:00


 


Intake Total 240 ml 300 ml


 


Balance 240 ml 300 ml


 


  


 


Intake Oral 240 ml 300 ml


 


# Voids 3 0


 


# Bowel Movements  0











 Vital Signs








  Date Time  Temp Pulse Resp B/P Pulse Ox O2 Delivery O2 Flow Rate FiO2


 


3/19/17 09:50  83      


 


3/19/17 09:50      Room Air  


 


3/19/17 08:00 98.8 84 18 157/97 100   


 


3/19/17 04:00 97.9 86 20 165/94 98   


 


3/19/17 00:00 98.3 88 20 162/91 99   


 


3/18/17 23:55      Nasal Cannula 2.00 


 


3/18/17 23:30  91      


 


3/18/17 20:00 98.2 92 20 138/84 99   


 


3/18/17 16:00 98.1 90 18 156/89 98   


 


3/18/17 14:12      Nasal Cannula 2.00 


 


3/18/17 12:00 97.1 91 18 165/93 98   








-:  


3/19/17 0913                                                                   

             3/19/17 0913








Physical Exam


General


Appearance:  No Acute Distress, Comfortable





Eyes


Eye Exam:  Pupils Equal





Throat


Throat Exam:  Oral Mucosa Pink & Moist





Neck


Neck Exam:  Neck Supple





Pulmonary


Resp Exam:  Breath Sounds Equal, No Distress, Rhonchi, Decreased Bases





Cardiology


CV Exam:  Regular, Normal Sinus Rhythm





Gastrointestinal/Abdomen


GI Exam:  Soft, Non-Tender, Bowel Sounds Present





Extremeties


Extremities Exam:  Trace Edema





Neurologic


Neuro Exam:  Alert, Awake





Psychiatric


Psych Exam:  Appropriate Responses





Assessment/Plan


Problem List:  


(1) ESRD (end stage renal disease) on dialysis


Plan:  MWF HD was done yesterday.


he has PermCath for HD


no electrolyte concerns


avoid IVF, gadolinium


high protein diet 


renal panel in am, he was placed on renvela.


K is 5.8, on Kcl  PO, will D/C and give one dose of Kayexalate.


Can be discharged and HD tomorrow.





(2) Stroke


Plan:  neurology to evaluate


he is not a candidate for TPA


CT and MRI negative, carotid scan negative


appreciate further recommendations 





(3) HIV (human immunodeficiency virus infection)


Plan:  resume HAART per home medications 








Problem Qualifiers





(1) Stroke:  


Qualified Code:  I63.9 - Cerebrovascular accident (CVA), unspecified mechanism





Heike Mustafa MD Mar 19, 2017 11:53

## 2017-03-19 NOTE — HHI.PR
Review/Management


Plan


Assessment and plan:


TIA


Stable from neurology standpoint


Follow up outpatient neurology in two weeks


Aspirin 81 mg


Diagnosis/Plan:  





Subjective


Subjective Comments


No acute events reported


Stable


No new complaint


Neurologic investigations are unremarkable


Active Medications





 Current Medications








 Medications


  (Trade)  Dose


 Ordered  Sig/Vaishali


 Route  Start Time


 Stop Time Status Last Admin


 


  (NS Flush)  2 ml  BID


 IVF  3/17/17 21:00


    3/19/17 08:31


 


 


  (NS Flush)  2 ml  UNSCH  PRN


 IVF  3/17/17 12:45


     


 


 


  (Vasotec  Inj)  1.25 mg  Q4H  PRN


 IV  3/17/17 12:45


     


 


 


  (Pravachol)  40 mg  HS


 PO  3/17/17 21:00


    3/18/17 22:46


 


 


  (D50w (Vial) Inj)  25 ml  UNSCH  PRN


 IV PUSH  3/17/17 12:45


     


 


 


  (Glucagon Inj)  1 mg  UNSCH  PRN


 IM/SQ  3/17/17 12:45


     


 


 


  (Folate)  1 mg  DAILY


 PO  3/18/17 09:00


    3/19/17 08:31


 


 


  (Synthroid)  200 mcg  DAILY@0600


 PO  3/18/17 06:00


    3/19/17 06:13


 


 


  (Renvela)  800 mg  DAILY


 PO  3/18/17 09:00


    3/19/17 08:31


 


 


  (Oscal-D 250-125)  500 mg  BID


 PO  3/17/17 21:00


    3/19/17 08:31


 


 


  (Heparin Inj)  5,000 units  Q12H


 SQ  3/17/17 13:30


    3/19/17 02:58


 


 


 Acetaminophen 650


 mg  650 mg  Q4H  PRN


 PO  3/17/17 13:45


     


 


 


  (NS 1000 ml Inj)  1,000 ml @ 


 0 mls/hr  Q0M PRN


 IV  3/17/17 15:02


     


 


 


 Heparin Sodium


  (Porcine) 8000


 units  8,000 units  UNSCH  PRN


 IVF  3/17/17 15:15


     


 


 


 Sodium Chloride  1,000 ml @ 


 200 mls/hr  Q5H PRN


 IV  3/17/17 15:02


     


 


 


  (NS 1000 ml Inj)  1,000 ml @ 


 0 mls/hr  Q0M PRN


 IV  3/17/17 15:02


     


 


 


  (Mannitol Inj)  12.5 gm  UNSCH  PRN


 IV  3/17/17 15:15


     


 


 


  (Albumin 25% Inj)  25 gm  UNSCH  PRN


 IV  3/17/17 15:15


     


 


 


  (NS Flush)  5 ml  UNSCH  PRN


 IVF  3/17/17 15:15


     


 


 


  (Heparin Inj)    UNSCH  PRN


 .XX  3/17/17 15:15


    3/17/17 19:30


 


 


  (Gentamicin


  (Dialysis) Inj)  20 mg  UNSCH  PRN


 IV  3/17/17 15:15


    3/17/17 19:30


 


 


  (Zofran Inj)  4 mg  UNSCH  PRN


 IV  3/17/17 15:15


     


 


 


  (Tylenol)  650 mg  UNSCH  PRN


 PO  3/17/17 15:15


     


 


 


  (Benadryl)  25 mg  UNSCH  PRN


 PO  3/17/17 15:15


     


 


 


  (Nitrostat Sl)  0.4 mg  UNSCH  PRN


 SL  3/17/17 15:15


     


 


 


  (Catapres)  0.1 mg  UNSCH  PRN


 PO  3/17/17 15:15


     


 


 


  (Epogen Inj)  5,000 units  UNSCH  PRN


 IV  3/17/17 15:15


    3/17/17 19:29


 


 


  (Gelfoam 12 Mm/7


 Mm Top)  1 foam  UNSCH  PRN


 TOP  3/17/17 15:15


     


 


 


  (Aspirin Chew)  81 mg  DAILY


 PO  3/18/17 09:00


    3/19/17 08:31


 


 


  (Lopressor)  50 mg  BID


 PO  3/19/17 21:00


     


 








Allergies





 Allergies


Coded Allergies


  No Known Allergies (Verified2/16/17)





Exam


I&O / VS











 3/18/17 3/18/17 3/19/17





 15:00 23:00 07:00


 


Intake Total 240 ml 240 ml 60 ml


 


Balance 240 ml 240 ml 60 ml


 


   


 


Intake Oral 240 ml 240 ml 60 ml


 


# Voids 3 0 0


 


# Bowel Movements  0 0








 Vital Signs








  Date Time  Temp Pulse Resp B/P Pulse Ox O2 Delivery O2 Flow Rate FiO2


 


3/19/17 12:16 97.4 83 18 154/83 98   


 


3/19/17 09:50  83      


 


3/19/17 09:50      Room Air  


 


3/19/17 08:00 98.8 84 18 157/97 100   


 


3/19/17 04:00 97.9 86 20 165/94 98   


 


3/19/17 00:00 98.3 88 20 162/91 99   


 


3/18/17 23:55      Nasal Cannula 2.00 


 


3/18/17 23:30  91      


 


3/18/17 20:00 98.2 92 20 138/84 99   


 


3/18/17 16:00 98.1 90 18 156/89 98   


 


3/18/17 14:12      Nasal Cannula 2.00 








Exam Comments


GENERAL:  Pleasant, aware, alert, not in acute distress. 


HEENT:  Atraumatic, normocephalic.  Intact hearing and intact vision.  There is 

a fistula on the right arm.


NECK:  Trachea in the midline.  No carotid bruit.  


CARDIOVASCULAR:  Regular rate and rhythm.


RESPIRATORY: Clear to auscultation.  No wheezing  


MUSCULOSKELETAL: No clubbing,cyanosis or edema. Moves all extremities equally.


NEUROLOGIC:  Awake, alert, oriented to time, person and place.  Intact naming. 

Intact repetition.  Cranial nerves II-XII are grossly intact.  Motor 

examination 5/5 bilateral symmetrical. Reflexes 2+ bilateral and symmetrical.  

Plantars are bilateral going. Sensation is intact bilateral and symmetrical. 

Finger-to-nose is normal. 


PSYCHIATRIC: Appropriate mood & affect, insight and judgment. No hallucination.





Objective


Radiology Results





Last 72 hours Impressions








Head Magnetic Resonance Angiography 3/17/17 1252 Signed





Impressions: 





 Service Date/Time:  Friday, March 17, 2017 13:12 - CONCLUSION:  No acute 





 disease.       Toni Vaca Jr., MD 


 


Head CT 3/17/17 1031 Signed





Impressions: 





 Service Date/Time:  Friday, March 17, 2017 10:56 - CONCLUSION:  1. No acute 





 intracranial abnormality seen. 2. Age-related atrophy and suspected small 

vessel 





 ischemic change in the white matter.     Jon Jordan MD 


 


Carotid Artery Ultrasound 3/17/17 0000 Signed





Impressions: 





 Service Date/Time:  Friday, March 17, 2017 13:47 - CONCLUSION:  1. Mild plaque 





 involving the ICAs without hemodynamically significant stenosis. 2. Antegrade 





 flow involving both vertebral arteries.     Toni Vaca Jr., MD 


 


Brain MRI 3/17/17 0000 Signed





Impressions: 





 Service Date/Time:  Friday, March 17, 2017 13:12 - CONCLUSION:   Marked 

central 





 and cortical atrophy with periventricular white matter changes, negative for 

an 





 acute ischemic event.    Praneeth Barksdale MD  FACR








Micro and Labs





Laboratory Tests








Test 3/19/17





 09:13


 


White Blood Count 4.8 


 


Red Blood Count 3.16 


 


Hemoglobin 11.1 


 


Hematocrit 34.2 


 


Mean Corpuscular Volume 108.2 


 


Mean Corpuscular Hemoglobin 35.0 


 


Mean Corpuscular Hemoglobin 32.3 





Concent 


 


Red Cell Distribution Width 17.0 


 


Platelet Count 119 


 


Mean Platelet Volume 10.3 


 


Neutrophils (%) (Auto) 65.7 


 


Lymphocytes (%) (Auto) 20.7 


 


Monocytes (%) (Auto) 10.5 


 


Eosinophils (%) (Auto) 2.1 


 


Basophils (%) (Auto) 1.0 


 


Neutrophils # (Auto) 3.1 


 


Lymphocytes # (Auto) 1.0 


 


Monocytes # (Auto) 0.5 


 


Eosinophils # (Auto) 0.1 


 


Basophils # (Auto) 0.0 


 


CBC Comment DIFF FINAL 


 


Differential Comment  


 


Sodium Level 137 


 


Potassium Level 5.8 


 


Chloride Level 100 


 


Carbon Dioxide Level 26.0 


 


Anion Gap 11 


 


Blood Urea Nitrogen 40 


 


Creatinine 6.71 


 


Estimat Glomerular Filtration 10 





Rate 


 


Random Glucose 92 


 


Calcium Level 8.7 


 


Phosphorus Level 4.2 


 


Magnesium Level 2.5 














Ofe Nance MD Mar 19, 2017 13:23

## 2017-03-19 NOTE — HHI.FPPN
Subjective


Remarks


Patient seen and examined this morning. He states that he feels great. When 

asked about nasal cannula, he states that he was actually not sure why he was 

still on it. It was removed during interview. He denies fever, chills, nausea, 

vomiting, chest pain, dizziness, confusion. He has been eating and drinking 

without difficulty. He has had normal function and has had bowel movements. He 

had multiple questions about his discharge and follow-up, which were answered. (

Aylin Cuello MD R1)





Objective


Vitals





 Vital Signs








  Date Time  Temp Pulse Resp B/P Pulse Ox O2 Delivery O2 Flow Rate FiO2


 


3/19/17 04:00 97.9 86 20 165/94 98   


 


3/19/17 00:00 98.3 88 20 162/91 99   


 


3/18/17 23:55      Nasal Cannula 2.00 


 


3/18/17 23:30  91      


 


3/18/17 20:00 98.2 92 20 138/84 99   


 


3/18/17 16:00 98.1 90 18 156/89 98   


 


3/18/17 14:12      Nasal Cannula 2.00 


 


3/18/17 12:00 97.1 91 18 165/93 98   








 I/O








 3/18/17 3/18/17 3/18/17 3/19/17 3/19/17 3/19/17





 07:00 15:00 23:00 07:00 15:00 23:00


 


Intake Total 360 ml 240 ml 240 ml 60 ml  


 


Balance 360 ml 240 ml 240 ml 60 ml  


 


      


 


Intake Oral 360 ml 240 ml 240 ml 60 ml  


 


# Voids  3 0 0  


 


# Bowel Movements   0 0  





 (Aylin Cuello MD R1)


Result Diagram:  


3/18/17 0755                                                                   

             3/18/17 0755





Imaging





Last Impressions








Head Magnetic Resonance Angiography 3/17/17 1252 Signed





Impressions: 





 Service Date/Time:  Friday, March 17, 2017 13:12 - CONCLUSION:  No acute 





 disease.       Toni Vaca Jr., MD 


 


Head CT 3/17/17 1031 Signed





Impressions: 





 Service Date/Time:  Friday, March 17, 2017 10:56 - CONCLUSION:  1. No acute 





 intracranial abnormality seen. 2. Age-related atrophy and suspected small 

vessel 





 ischemic change in the white matter.     Jon Jordan MD 


 


Carotid Artery Ultrasound 3/17/17 0000 Signed





Impressions: 





 Service Date/Time:  Friday, March 17, 2017 13:47 - CONCLUSION:  1. Mild plaque 





 involving the ICAs without hemodynamically significant stenosis. 2. Antegrade 





 flow involving both vertebral arteries.     Toni Vaca Jr., MD 


 


Brain MRI 3/17/17 0000 Signed





Impressions: 





 Service Date/Time:  Friday, March 17, 2017 13:12 - CONCLUSION:   Marked 

central 





 and cortical atrophy with periventricular white matter changes, negative for 

an 





 acute ischemic event.    Praneeth Barksdale MD  FACR








Objective Remarks


GENERAL: This is a pleasant, thin, male sitting up in bed in no apparent 

distress.


SKIN: No rashes, ecchymoses or lesions. Cool and dry. There is a recently 

placed fistula palpable on the right arm. There is a Vas-Cath placed in the 

right upper chest without evidence of bleeding or infection.


HEAD: Atraumatic. Normocephalic. Healed scar on right forehead. No temporal or 

scalp tenderness.


EYES: Arcus senilis noted bilaterally. Pupils equal round and reactive, normal 

size, extraocular motions intact. No scleral icterus. No injection or drainage. 


ENT: Nose without bleeding, purulent drainage or septal hematoma. Throat 

without erythema, tonsillar hypertrophy or exudate. Throat and oral mucosa 

moist today. Uvula midline. Airway patent.


NECK: Trachea midline. No JVD or lymphadenopathy. Supple, nontender, no 

meningeal signs.


CARDIOVASCULAR: Regular rate and rhythm. There is a 4/6 holosystolic ejection 

murmur heard throughout the cardiac field, loudest at the left upper sternal 

border.


RESPIRATORY: Clear to auscultation. Breath sounds equal bilaterally. No wheezes

, rales, or rhonchi.


GASTROINTESTINAL: Abdomen soft, thin, non-tender, nondistended. No hepato-

splenomegaly, or palpable masses. No guarding.


MUSCULOSKELETAL: Extremities without clubbing, cyanosis, or edema. No joint 

tenderness, effusion, or edema noted. No calf tenderness. Negative Homans sign 

bilaterally.


NEUROLOGICAL: Awake and alert. Word-finding difficulty noted. Grossly normal 

sensation and motor function. Cranial nerves II through XII intact. No pronator 

drift. Normal strength. Normal gait. Normal speech.


Medications and IVs





 Inpatient Medications


Acetaminophen (Tylenol) 650 mg UNSCH  PRN PO for headach, pain, temp > 101F;  

Start 3/17/17 at 15:15


Albumin Human (Albumin 25% Inj) 25 gm UNSCH  PRN IV WITH DIALYSIS;  Start 3/17/

17 at 15:15


Aspirin (Aspirin Chew) 81 mg DAILY PO  Last administered on 3/19/17at 08:31;  

Start 3/18/17 at 09:00


Aspirin (Aspirin) 325 mg DAILY PO  Last administered on 3/17/17at 13:55;  Start 

3/17/17 at 13:00;  Stop 3/17/17 at 17:51;  Status DC


Calcium/Vitamin D (Oscal-D 250-125) 500 mg BID PO  Last administered on 3/19/

17at 08:31;  Start 3/17/17 at 21:00


Clonidine (Catapres) 0.1 mg UNSCH  PRN PO for BP > 180/100 X 2 readings;  Start 

3/17/17 at 15:15


Dextrose (D50w (Vial) Inj) 25 ml UNSCH  PRN IV PUSH HYPOGLYCEMIA-SEE COMMENTS;  

Start 3/17/17 at 12:45


Diphenhydramine HCl (Benadryl) 25 mg UNSCH  PRN PO for hives/itching/anaphylaxis

;  Start 3/17/17 at 15:15


Enalaprilat (Vasotec  Inj) 1.25 mg Q4H  PRN IV For SBP > 220 or DBP > 120;  

Start 3/17/17 at 12:45


Epoetin Derek (Epogen Inj) 5,000 units UNSCH  PRN IV WITH DIALYSIS Last 

administered on 3/17/17at 19:29;  Start 3/17/17 at 15:15


Folic Acid (Folate) 1 mg DAILY PO  Last administered on 3/19/17at 08:31;  Start 

3/18/17 at 09:00


Gelatin (Gelfoam 12 Mm/7 Mm Top) 1 foam UNSCH  PRN TOP SEE LABEL COMMENTS;  

Start 3/17/17 at 15:15


Gentamicin Sulfate (Gentamicin (Dialysis) Inj) 20 mg UNSCH  PRN IV WITH 

DIALYSIS Last administered on 3/17/17at 19:30;  Start 3/17/17 at 15:15


Glucagon (Glucagon Inj) 1 mg UNSCH  PRN IM/SQ HYPOGLYCEMIA-SEE COMMENTS;  Start 

3/17/17 at 12:45


Heparin Sodium (Porcine) (Heparin Inj)  UNSCH  PRN .XX WITH DIALYSIS Last 

administered on 3/17/17at 19:30;  Start 3/17/17 at 15:15


Heparin Sodium (Porcine) 8000 units 8,000 units UNSCH  PRN IVF WITH DIALYSIS;  

Start 3/17/17 at 15:15


Insulin Aspart (NovoLOG SUPPLEMENTAL SCALE) 1 ACHS SLIDING  SCALE SQ ;  Start 3/

17/17 at 16:00


IV Flush (NS Flush) 5 ml UNSCH  PRN IVF WITH DIALYSIS;  Start 3/17/17 at 15:15


Levothyroxine Sodium (Synthroid) 200 mcg DAILY@0600 PO  Last administered on 3/

19/17at 06:13;  Start 3/18/17 at 06:00


Mannitol (Mannitol Inj) 12.5 gm UNSCH  PRN IV WITH DIALYSIS;  Start 3/17/17 at 

15:15


Metoprolol Tartrate (Lopressor) 25 mg BID PO  Last administered on 3/19/17at 08:

31;  Start 3/18/17 at 09:00


Nitroglycerin (Nitrostat Sl) 0.4 mg UNSCH  PRN SL CHEST PAIN;  Start 3/17/17 at 

15:15


Ondansetron HCl (Zofran Inj) 4 mg UNSCH  PRN IV WITH DIALYSIS;  Start 3/17/17 

at 15:15


Potassium Chloride (KCl) 10 meq DAILY PO  Last administered on 3/18/17at 09:54;

  Start 3/18/17 at 09:00


Pravastatin Sodium (Pravachol) 40 mg HS PO  Last administered on 3/18/17at 22:46

;  Start 3/17/17 at 21:00


Sevelamer Carbonate (Renvela) 800 mg DAILY PO  Last administered on 3/19/17at 08

:31;  Start 3/18/17 at 09:00


Sodium Chloride (NS 1000 ml Inj) 1,000 ml @  0 mls/hr Q0M PRN IV WITH DIALYSIS;

  Start 3/17/17 at 15:02 (Aylin Cuello MD R1)


Urinary Catheter:  No (Aylin Cuello MD R1)


Vascular Central Line Catheter:  No (Aylin Cuello MD R1)





A/P


Assessment and Plan


77 year old male with a history of HIV and ESRD who presents with acute 

neurologic changes, last normal this morning, admitted for stroke work-up. 

Workup has been negative for stroke, indicating likely TIA. He has been 

optimally medically managed as an inpatient and will be discharged with close 

follow-up.


Discharge Planning


Discharge today with follow-up for nephrology, cardiology, PCP within one week. 

Patient to continue dialysis MWF as is his normal, next session tomorrow (

Aylin Cuello MD R1)


Attending Attestation


Patient examined and case discussed with resident physicians


I have read the bone agree with assessment/plan as discussed with me


I was involved in all medical decision making for this patient





Eron Lynn M.D. (Eron Lynn MD)


Problem List:  


(1) Neurologic abnormality


Status:  Acute


Plan:  Acute CVA vs. TIA on admission, with workup more indicative of TIA given 

resolution of neurologic symptoms within 24 hours and negative imaging workup. 

We will continue medical management of risk factors as outpatient:





Hospital course:


-CT was negative for bleeding but with diffuse white matter changes


-MRI/MRA brain to r/o ischemic stroke: 


      MRA 3/17 showing excellent visualization of the major intracranial 

arteries was no evidence of aneurysm, vessel truncation, stenosis, or vascular 

malformation


      MRI 3/17: Marked central and cortical atrophy with periventricular white 

matter changes, negative for ischemic event.


-Carotid US 3/17: Involving ICAs without hemodynamically significant stenosis. 

Anterograde flow normal in vertebral arteries.


-EKGs trended, do not show evidence of atrial fibrillation or infarct, notable 

for sinus rhythm and possible left atrial enlargement. Unchanged from EKG 

documented November 2016.


-MI r/o ordered with understanding the stroke itself can cause elevated 

troponins. Troponins were 0.06, 0.07, 0.08, not indicative of acute cardiac 

ischemic event


-2D ECHO 3/18: Notable for elevated peak PA pressure of 85. EF 5560%, moderate 

TR. He is following with Dr. Godfrey as outpatient already--> to follow-up as 

outpatient within one week





Plan:


- Aspirin 325mg once, will continue aspirin 81 mg daily


-On rosuvastatin 20 mg at home. Was started on pravastatin 40mg inpt. Continue 

rosuvastatin at home, lipid profile wnl


-A1c pending at discharge


-Neuro checks q4 hrs unremarkable. 


-Tele - no acute events.


-Out of bed ad chanel, PT did not recommend any additional therapy or assistance


-Neurology consulted, appreciate recs


-PT/OT/ST and case management cleared for discharge


-Heart healthy diet





(2) ESRD (end stage renal disease) on dialysis


Status:  Chronic


Plan:  Chronic. On dialysis MWF by Vas-Cath. Dialysis session completed on 3/17/

17, due 3/20/17 as outpatient


-Continue dialysis MWF as inpatient


-Continue home medications - hold morning potassium given potassium is 4.6 today

, to follow up at dialysis center


-Other recs per nephrology





(3) HIV (human immunodeficiency virus infection)


Status:  Chronic


Plan:  Unknown last CD4, reportedly low viral load. ID physician is Dr. See.


-Patient on Triumec as outpatient per pharmacy reconciliation, patient cannot 

offer the name. This was held as inpatient, patient was told to follow up with 

Dr. See and to continue antiretrovirals upon discharge today





(4) Aortic regurgitation


Status:  Acute


Plan:  Significant murmur noted on exam today. Last echo in EMR 2013 showing EF 

55% and mild AR.


-2D ECHO 3/18/17: Notable for elevated peak PA pressure of 85. EF 5560%, mild 

AR ,moderate TR. He is following with Dr. Godfrey as outpatient already--> to 

follow-up as outpatient within one week





(5) QT prolongation


Status:  Acute


Plan:  QTc 438 on admission EKG. 


-Avoid QT prolonging medications


-telemetry showing no acute events





(6) HTN (hypertension)


Status:  Chronic


Plan:  Chronic. Admitted for stroke workup therefore permissive hypertension 

was allowed initially. No evidence of stroke, therefore, continued home 

medications 3/18. BPs 150s to 170 systolic, suggesting additional medication 

needed.


-Restart home metoprolol 25 mg twice a day on 3/18, increase to 50mg BID today


-Added amlodipine 5 mg daily 3/19, will discontinue given metoprolol has room 

to be increased. Recommended outpt titration with close PCP follow-up


-Clonidine and Vasotec ordered PRN, not required this hospital stay





(7) Fluids/Electrolytes/Nutrition/Prophylaxis 


Status:  Acute


Plan:  


Fluids: ESRD on dialysis MWF


Electrolytes: within normal limits at admission, will monitor


Nutrition: Renal diet


DVT Prophylaxis: Heparin 5000U subQ q12hr


GI Prophylaxis: Not indicated


 (Aylin Cuello MD R1)








Aylin Cuello MD R1 Mar 19, 2017 08:15


Eron Lynn MD Mar 19, 2017 11:26

## 2017-03-19 NOTE — HHI.DS
Discharge Summary


Admission Date


Mar 17, 2017 at 11:32


Discharge Date:  Mar 19, 2017


Admitting Diagnosis


stroke





(1) Neurologic abnormality


Diagnosis:  Principal


Plan:  Acute CVA vs. TIA on admission, with workup more indicative of TIA given 

resolution of neurologic symptoms within 24 hours and negative imaging workup. 

We will continue medical management of risk factors as outpatient:





Hospital course:


-CT was negative for bleeding but with diffuse white matter changes


-MRI/MRA brain to r/o ischemic stroke: 


      MRA 3/17 showing excellent visualization of the major intracranial 

arteries was no evidence of aneurysm, vessel truncation, stenosis, or vascular 

malformation


      MRI 3/17: Marked central and cortical atrophy with periventricular white 

matter changes, negative for ischemic event.


-Carotid US 3/17: Involving ICAs without hemodynamically significant stenosis. 

Anterograde flow normal in vertebral arteries.


-EKGs trended, do not show evidence of atrial fibrillation or infarct, notable 

for sinus rhythm and possible left atrial enlargement. Unchanged from EKG 

documented November 2016.


-MI r/o ordered with understanding the stroke itself can cause elevated 

troponins. Troponins were 0.06, 0.07, 0.08, not indicative of acute cardiac 

ischemic event


-2D ECHO 3/18: Notable for elevated peak PA pressure of 85. EF 5560%, moderate 

TR. He is following with Dr. Godfrey as outpatient already--> to follow-up as 

outpatient within one week





Plan:


- Aspirin 325mg once, will continue aspirin 81 mg daily


-On rosuvastatin 20 mg at home. Was started on pravastatin 40mg inpt. Continue 

rosuvastatin at home, lipid profile wnl


-A1c pending at discharge


-Neuro checks q4 hrs unremarkable. 


-Tele - no acute events.


-Out of bed ad chanel, PT did not recommend any additional therapy or assistance


-Neurology consulted, appreciate recs


-PT/OT/ST and case management cleared for discharge


-Heart healthy diet





(2) ESRD (end stage renal disease) on dialysis


Diagnosis:  Secondary


Plan:  Chronic. On dialysis MWF by Vas-Cath. Dialysis session completed on 3/17/

17, due 3/20/17 as outpatient


-Continue dialysis MWF as inpatient


-Continue home medications - hold morning potassium given potassium is 4.6 today

, to follow up at dialysis center


-Other recs per nephrology





(3) HIV (human immunodeficiency virus infection)


Diagnosis:  Secondary


Plan:  Unknown last CD4, reportedly low viral load. ID physician is Dr. See.


-Patient on Triumec as outpatient per pharmacy reconciliation, patient cannot 

offer the name. This was held as inpatient, patient was told to follow up with 

Dr. See and to continue antiretrovirals upon discharge today





(4) Aortic regurgitation


Diagnosis:  Secondary


Plan:  Significant murmur noted on exam today. Last echo in EMR 2013 showing EF 

55% and mild AR.


-2D ECHO 3/18/17: Notable for elevated peak PA pressure of 85. EF 5560%, mild 

AR ,moderate TR. He is following with Dr. Godfrey as outpatient already--> to 

follow-up as outpatient within one week





(5) QT prolongation


Diagnosis:  Secondary


Plan:  QTc 438 on admission EKG. 


-Avoid QT prolonging medications


-telemetry showing no acute events





(6) HTN (hypertension)


Diagnosis:  Secondary


Plan:  Chronic. Admitted for stroke workup therefore permissive hypertension 

was allowed initially. No evidence of stroke, therefore, continued home 

medications 3/18. BPs 150s to 170 systolic, suggesting additional medication 

needed.


-Restart home metoprolol 25 mg twice a day on 3/18, increase to 50mg BID today


-Added amlodipine 5 mg daily 3/19, will discontinue given metoprolol has room 

to be increased. Recommended outpt titration with close PCP follow-up


-Clonidine and Vasotec ordered PRN, not required this hospital stay





Consultants


Nephrology


Neurology


Procedures


Dialysis March 18, 2017


Brief History


78 yo AAM presenting to the ED with confusion, slurred speech, and difficulty 

with finding words starting this morning.  He states he was normal at 

dinnertime last night and when he went to bed around 10 PM.  However, when he 

woke up this morning, he was noted to have slurred speech and difficulty 

finding words when he was talking to his friend.  He states that he could not 

read the newspaper this morning due to blurry vision. He denies headaches, 

dizziness, and chest pain. Chest did "feel off" this morning, not like pressure 

or pain but "I knew something was wrong." Denies jaw or arm pain. Denies 

numbness or tingling in the body, including face and tongue.  Better now.





At the time of this interview, he was doing much better with resolution of his 

slurred speech and his ability to find words.


He endorses some left arm weakness, with some inability to raise his arm 

actively over his head.  This continues at this time.


CBC/BMP:  


3/19/17 0913                                                                   

             3/19/17 0913





Significant Findings





Laboratory Tests








Test 3/17/17 3/17/17 3/17/17 3/18/17





 10:40 18:00 23:02 07:55


 


Red Blood Count 3.39 MIL/MM3   3.29 MIL/MM3





 (4.50-5.90)   (4.50-5.90)


 


Hemoglobin 11.8 GM/DL   11.4 GM/DL





 (13.0-17.0)   (13.0-17.0)


 


Hematocrit 37.0 %   35.3 %





 (39.0-51.0)   (39.0-51.0)


 


Mean Corpuscular Volume 109.0 FL   107.3 FL





 (80.0-100.0)   (80.0-100.0)


 


Mean Corpuscular Hemoglobin 34.7 PG   34.7 PG





 (27.0-34.0)   (27.0-34.0)


 


Mean Corpuscular Hemoglobin 31.8 %   





Concent (32.0-36.0)   


 


Platelet Count 127 TH/MM3   115 TH/MM3





 (150-450)   (150-450)


 


Monocytes (%) (Auto) 10.9 %   10.6 %





 (0.0-8.0)   (0.0-8.0)


 


Lymphocytes # (Auto) 0.9 TH/MM3   





 (1.0-4.8)   


 


Blood Urea Nitrogen 36 MG/DL (7-18)   29 MG/DL (7-18)


 


Creatinine 6.35 MG/DL   5.30 MG/DL





 (0.60-1.30)   (0.60-1.30)


 


Estimat Glomerular Filtration 10 ML/MIN (>89)   13 ML/MIN (>89)





Rate    


 


Aspartate Amino Transf 39 U/L (15-37)   





(AST/SGOT)    


 


Alkaline Phosphatase 212 U/L   165 U/L





 ()   ()


 


Troponin I 0.06 NG/ML 0.07 NG/ML 0.08 NG/ML 





 (0.02-0.05) (0.02-0.05) (0.02-0.05) 


 


Total Protein 8.3 GM/DL   





 (6.4-8.2)   


 


Albumin    2.9 GM/DL





    (3.4-5.0)


 


LDL Cholesterol    110 MG/DL





    (0-99)


 


HDL Cholesterol    71.9 MG/DL





    (40.0-60.0)


 


    





Test 3/19/17   





 09:13   


 


Red Blood Count 3.16 MIL/MM3   





 (4.50-5.90)   


 


Hemoglobin 11.1 GM/DL   





 (13.0-17.0)   


 


Hematocrit 34.2 %   





 (39.0-51.0)   


 


Mean Corpuscular Volume 108.2 FL   





 (80.0-100.0)   


 


Mean Corpuscular Hemoglobin 35.0 PG   





 (27.0-34.0)   


 


Platelet Count 119 TH/MM3   





 (150-450)   


 


Monocytes (%) (Auto) 10.5 %   





 (0.0-8.0)   


 


Potassium Level 5.8 MEQ/L   





 (3.5-5.1)   


 


Blood Urea Nitrogen 40 MG/DL (7-18)   


 


Creatinine 6.71 MG/DL   





 (0.60-1.30)   


 


Estimat Glomerular Filtration 10 ML/MIN (>89)   





Rate    








Imaging





Last Impressions








Head Magnetic Resonance Angiography 3/17/17 1252 Signed





Impressions: 





 Service Date/Time:  Friday, March 17, 2017 13:12 - CONCLUSION:  No acute 





 disease.       Toni Vaca Jr., MD 


 


Head CT 3/17/17 1031 Signed





Impressions: 





 Service Date/Time:  Friday, March 17, 2017 10:56 - CONCLUSION:  1. No acute 





 intracranial abnormality seen. 2. Age-related atrophy and suspected small 

vessel 





 ischemic change in the white matter.     Jon Jordan MD 


 


Carotid Artery Ultrasound 3/17/17 0000 Signed





Impressions: 





 Service Date/Time:  Friday, March 17, 2017 13:47 - CONCLUSION:  1. Mild plaque 





 involving the ICAs without hemodynamically significant stenosis. 2. Antegrade 





 flow involving both vertebral arteries.     Toni Vaca Jr., MD 


 


Brain MRI 3/17/17 0000 Signed





Impressions: 





 Service Date/Time:  Friday, March 17, 2017 13:12 - CONCLUSION:   Marked 

central 





 and cortical atrophy with periventricular white matter changes, negative for 

an 





 acute ischemic event.    Praneeth Barksdale MD  FACR








PE at Discharge


GENERAL: This is a pleasant, thin, male sitting up in bed in no apparent 

distress.


SKIN: No rashes, ecchymoses or lesions. Cool and dry. There is a recently 

placed fistula palpable on the right arm. There is a Vas-Cath placed in the 

right upper chest without evidence of bleeding or infection.


HEAD: Atraumatic. Normocephalic. Healed scar on right forehead. No temporal or 

scalp tenderness.


EYES: Arcus senilis noted bilaterally. Pupils equal round and reactive, normal 

size, extraocular motions intact. No scleral icterus. No injection or drainage. 


ENT: Nose without bleeding, purulent drainage or septal hematoma. Throat 

without erythema, tonsillar hypertrophy or exudate. Throat and oral mucosa 

moist today. Uvula midline. Airway patent.


NECK: Trachea midline. No JVD or lymphadenopathy. Supple, nontender, no 

meningeal signs.


CARDIOVASCULAR: Regular rate and rhythm. There is a 4/6 holosystolic ejection 

murmur heard throughout the cardiac field, loudest at the left upper sternal 

border.


RESPIRATORY: Clear to auscultation. Breath sounds equal bilaterally. No wheezes

, rales, or rhonchi.


GASTROINTESTINAL: Abdomen soft, thin, non-tender, nondistended. No hepato-

splenomegaly, or palpable masses. No guarding.


MUSCULOSKELETAL: Extremities without clubbing, cyanosis, or edema. No joint 

tenderness, effusion, or edema noted. No calf tenderness. Negative Homans sign 

bilaterally.


NEUROLOGICAL: Awake and alert. Word-finding difficulty noted. Grossly normal 

sensation and motor function. Cranial nerves II through XII intact. No pronator 

drift. Normal strength. Normal gait. Normal speech.


Hospital Course


77 year old male with a history of HIV, HTN, and ESRD who presents with acute 

neurologic changes, last normal >12 hours prior to admission, admitted for 

stroke work-up. Workup has been negative for stroke, indicating likely TIA. Work

-up findings outlined above under "Neurologic abnormality." He is noted to have 

loud systolic ejection murmur and echo shows significantly elevated peak 

pulmonary artery pressure which is not reflected on echocardiogram in 2013. 

Patient sees Dr. Godfrey as outpatient, recommended close follow-up with him as 

outpatient. Regarding stroke work-up, neurology evaluated patient and 

recommended medical management for likely TIA. His blood pressure medication 

was increased from metoprolol 25mg BID to 50mg BID with recommended titration 

as outpatient. His lipid profile was normal, recommended continued use of home 

dose rosuvastatin. He continued regular schedule dialysis MWF while inpatient. 

He has been optimally medically managed as inpatient and will be discharged 

with close follow-up with nephrology, ID, cardiology, and PCP.


Pt Condition on Discharge:  Stable


Discharge Disposition:  Discharge Home


Discharge Instructions


DIET: Follow Instructions for:  Renal Failure Diet


Activities you can perform:  Regular-No Restrictions


Follow up Referrals:  


Cardiology - 1 Week


Nephrology - Next Day


Physician - 1 Week





New Medications:  


Metoprolol Tartrate (Metoprolol Tartrate) 25 Mg Tab


50 MG PO BID #60 TAB


 


Continued Medications:  


Abacavir-Dolutegravir-Lamivudine (Triumeq) 600- Mg Tab


1 TAB PO DAILY Hazardous agent; use appropriate precautions for handling & 

disposal. Mgmt Viral Infection #30 Ref 0 TAB


Aspirin DR (Aspirin Adult Low Strength) 81 Mg Tabdr


81 MG PO MOWEFR Take 1 tablet (81mg) at bedtime on Monday,Wednesday and Friday #

30 TAB (This prescription has been renewed)


Calcium Carbonate-Cholecalciferol (Calcium 600+D) 600-800 Mg-Unit Tab


1 TAB PO BID TAB


Dronabinol (Marinol) 2.5 Mg Cap


2.5 MG PO HS Ref 0 CAP


Folic Acid (Folate) 1 Mg Tab


1 MG PO DAILY Nutritional Supplement Ref 0 TAB


Levothyroxine (Synthroid) 200 Mcg Tab


200 MCG PO DAILY Thyroid #30 Ref 0 TAB


Midodrine (Midodrine) 10 Mg Tab


10 MG PO TID Control Low Blood Pressure #90 Ref 0 TAB


Pancrelipase (Zenpep) 25,000-85,000-136,000 Units Cap


2 CAP PO BID Digestive Aid #90 Ref 0 CAP


Potassium Chloride ER (Potassium Chloride ER) 10 Meq Cap


10 MEQ PO DAILY Electrolyte Replacement #30 Ref 0 CAP


Rosuvastatin (Rosuvastatin) 20 Mg Tab


20 MG PO MOWEFR Take 1 tablet (5mg) at bedtime on Monday,Wednesday and Friday 

Cholesterol Management #30 Ref 0 TAB (This prescription has been renewed)


Sevelamer Carbonate (Renvela) 800 Mg Tab


800 MG PO DAILY IN THE AM Control phosphorous levels #90 Ref 0 TAB


Valacyclovir (Valtrex) 1 Gm Tab


1000 MG PO DAILY Mgmt Viral Infection #30 Ref 0 TAB


 


Discontinued Medications:  


Metoprolol Tartrate (Metoprolol Tartrate) 25 Mg Tab


25 MG PO BID #60 Ref 0 TAB











Aylin Cuello MD R1 Mar 19, 2017 11:22

## 2017-09-25 ENCOUNTER — HOSPITAL ENCOUNTER (OUTPATIENT)
Dept: HOSPITAL 17 - HRSP | Age: 77
End: 2017-09-25
Attending: SPECIALIST
Payer: MEDICARE

## 2017-09-25 DIAGNOSIS — R06.2: Primary | ICD-10-CM

## 2017-09-25 DIAGNOSIS — I34.0: ICD-10-CM

## 2017-09-25 DIAGNOSIS — R91.8: ICD-10-CM

## 2017-09-25 LAB
BASE EXCESS BLD CALC-SCNC: 1.8 MMOL/L (ref -2–2)
BENZODIAZEPINES PNL UR: 96 % (ref 90–100)
BLOOD GAS CARBOXYHEMOGLOBIN: 1.7 % (ref 0–4)
BLOOD GAS HCO3: 25 MMOL/L (ref 22–26)
BLOOD GAS OXYGEN CONTENT: 14.1 VOL % (ref 12–20)
BLOOD GAS PCO2: 32 MMHG (ref 38–42)
CRITICAL VALUE: NO
DRAW SITE: (no result)
METHGB MFR BLDA: 1.2 % (ref 0–2)
NUMBER OF ARTERIAL PUNCTURES: 1
O2/TOTAL GAS SETTING VFR VENT: 21 %
OXYGEN DEVICE: (no result)
PO2 BLD: 111 MMHG (ref 61–120)
SALICYLATES SERPL-MCNC: 10.3 G/DL (ref 12–16)
STAT: NO
TEMP CORR TO: 98.6
ULNAR PULSE: PRESENT

## 2017-09-25 PROCEDURE — 82805 BLOOD GASES W/O2 SATURATION: CPT

## 2017-09-25 PROCEDURE — 36600 WITHDRAWAL OF ARTERIAL BLOOD: CPT

## 2018-01-12 ENCOUNTER — HOSPITAL ENCOUNTER (OUTPATIENT)
Dept: HOSPITAL 17 - HRAD | Age: 78
End: 2018-01-12
Attending: SPECIALIST
Payer: MEDICARE

## 2018-01-12 DIAGNOSIS — R06.02: Primary | ICD-10-CM

## 2018-01-12 DIAGNOSIS — R53.1: ICD-10-CM

## 2018-01-12 PROCEDURE — 71046 X-RAY EXAM CHEST 2 VIEWS: CPT

## 2018-01-12 NOTE — RADRPT
EXAM DATE/TIME:  01/12/2018 10:58 

 

[facility] COMPARISON:     

CHEST SINGLE AP, August 06, 2016, 11:16.  CHEST SINGLE AP, October 05, 2016, 13:18.

 

                     

INDICATIONS :     

Short of breath, weakness, evaluate for pneumonia

                     

 

MEDICAL HISTORY :     

Hypertension.  HIV.     dialysis   

 

SURGICAL HISTORY :        

hernia repair, cyst removed from spine, 

 

ENCOUNTER:     

Initial                                        

 

ACUITY:     

1 day      

 

PAIN SCORE:     

0/10

 

LOCATION:     

Bilateral chest 

 

FINDINGS:     

Frontal and lateral views of the chest demonstrate normal-sized cardiac silhouette this patient post 
median sternotomy and valve replacements. No effusion, consolidation, or pneumothorax is identified. 
The bones and soft tissues demonstrate no acute finding. Has been interval median sternotomy.

 

CONCLUSION:     

No acute cardiopulmonary abnormality is identified.

 

 

 

 Jon Dunbar MD on January 12, 2018 at 11:29           

Board Certified Radiologist.

 This report was verified electronically.